# Patient Record
Sex: MALE | Race: WHITE | Employment: OTHER | URBAN - METROPOLITAN AREA
[De-identification: names, ages, dates, MRNs, and addresses within clinical notes are randomized per-mention and may not be internally consistent; named-entity substitution may affect disease eponyms.]

---

## 2018-11-27 ENCOUNTER — OFFICE VISIT (OUTPATIENT)
Dept: ORTHOPEDIC SURGERY | Age: 73
End: 2018-11-27
Payer: MEDICARE

## 2018-11-27 VITALS
SYSTOLIC BLOOD PRESSURE: 136 MMHG | BODY MASS INDEX: 27.59 KG/M2 | DIASTOLIC BLOOD PRESSURE: 84 MMHG | WEIGHT: 215 LBS | HEIGHT: 74 IN | HEART RATE: 85 BPM

## 2018-11-27 DIAGNOSIS — M75.101 TEAR OF RIGHT ROTATOR CUFF, UNSPECIFIED TEAR EXTENT: ICD-10-CM

## 2018-11-27 DIAGNOSIS — M19.011 OSTEOARTHRITIS OF GLENOHUMERAL JOINT, RIGHT: Primary | ICD-10-CM

## 2018-11-27 DIAGNOSIS — M25.511 RIGHT SHOULDER PAIN, UNSPECIFIED CHRONICITY: ICD-10-CM

## 2018-11-27 PROCEDURE — 1036F TOBACCO NON-USER: CPT | Performed by: PHYSICIAN ASSISTANT

## 2018-11-27 PROCEDURE — 1101F PT FALLS ASSESS-DOCD LE1/YR: CPT | Performed by: PHYSICIAN ASSISTANT

## 2018-11-27 PROCEDURE — 1123F ACP DISCUSS/DSCN MKR DOCD: CPT | Performed by: PHYSICIAN ASSISTANT

## 2018-11-27 PROCEDURE — 4040F PNEUMOC VAC/ADMIN/RCVD: CPT | Performed by: PHYSICIAN ASSISTANT

## 2018-11-27 PROCEDURE — 99213 OFFICE O/P EST LOW 20 MIN: CPT | Performed by: PHYSICIAN ASSISTANT

## 2018-11-27 PROCEDURE — G8484 FLU IMMUNIZE NO ADMIN: HCPCS | Performed by: PHYSICIAN ASSISTANT

## 2018-11-27 PROCEDURE — 3017F COLORECTAL CA SCREEN DOC REV: CPT | Performed by: PHYSICIAN ASSISTANT

## 2018-11-27 PROCEDURE — G8427 DOCREV CUR MEDS BY ELIG CLIN: HCPCS | Performed by: PHYSICIAN ASSISTANT

## 2018-11-27 PROCEDURE — G8419 CALC BMI OUT NRM PARAM NOF/U: HCPCS | Performed by: PHYSICIAN ASSISTANT

## 2018-11-27 RX ORDER — OMEPRAZOLE 40 MG/1
CAPSULE, DELAYED RELEASE ORAL
COMMUNITY
Start: 2018-11-24

## 2018-11-27 RX ORDER — MELOXICAM 15 MG/1
15 TABLET ORAL DAILY
Qty: 30 TABLET | Refills: 1 | Status: SHIPPED | OUTPATIENT
Start: 2018-11-27 | End: 2019-02-09 | Stop reason: SDUPTHER

## 2018-11-27 RX ORDER — LISINOPRIL AND HYDROCHLOROTHIAZIDE 12.5; 1 MG/1; MG/1
TABLET ORAL
COMMUNITY
Start: 2018-11-24 | End: 2019-09-26

## 2018-11-27 RX ORDER — ATORVASTATIN CALCIUM 40 MG/1
TABLET, FILM COATED ORAL
Refills: 2 | COMMUNITY
Start: 2018-10-23 | End: 2022-01-25

## 2018-11-27 RX ORDER — EZETIMIBE AND SIMVASTATIN 10; 20 MG/1; MG/1
TABLET ORAL
Refills: 0 | COMMUNITY
Start: 2018-08-26 | End: 2018-11-27

## 2018-11-27 RX ORDER — DOXAZOSIN 8 MG/1
TABLET ORAL
COMMUNITY
Start: 2018-11-24

## 2019-01-05 ENCOUNTER — OFFICE VISIT (OUTPATIENT)
Dept: ORTHOPEDIC SURGERY | Age: 74
End: 2019-01-05
Payer: MEDICARE

## 2019-01-05 VITALS
HEIGHT: 74 IN | WEIGHT: 214.95 LBS | DIASTOLIC BLOOD PRESSURE: 94 MMHG | HEART RATE: 70 BPM | SYSTOLIC BLOOD PRESSURE: 145 MMHG | BODY MASS INDEX: 27.59 KG/M2

## 2019-01-05 DIAGNOSIS — M19.011 OSTEOARTHRITIS OF GLENOHUMERAL JOINT, RIGHT: ICD-10-CM

## 2019-01-05 DIAGNOSIS — M75.101 TEAR OF RIGHT ROTATOR CUFF, UNSPECIFIED TEAR EXTENT: ICD-10-CM

## 2019-01-05 DIAGNOSIS — M25.512 LEFT SHOULDER PAIN, UNSPECIFIED CHRONICITY: ICD-10-CM

## 2019-01-05 DIAGNOSIS — M25.511 RIGHT SHOULDER PAIN, UNSPECIFIED CHRONICITY: Primary | ICD-10-CM

## 2019-01-05 PROCEDURE — G8484 FLU IMMUNIZE NO ADMIN: HCPCS | Performed by: ORTHOPAEDIC SURGERY

## 2019-01-05 PROCEDURE — 1123F ACP DISCUSS/DSCN MKR DOCD: CPT | Performed by: ORTHOPAEDIC SURGERY

## 2019-01-05 PROCEDURE — 1036F TOBACCO NON-USER: CPT | Performed by: ORTHOPAEDIC SURGERY

## 2019-01-05 PROCEDURE — 3017F COLORECTAL CA SCREEN DOC REV: CPT | Performed by: ORTHOPAEDIC SURGERY

## 2019-01-05 PROCEDURE — 1101F PT FALLS ASSESS-DOCD LE1/YR: CPT | Performed by: ORTHOPAEDIC SURGERY

## 2019-01-05 PROCEDURE — G8427 DOCREV CUR MEDS BY ELIG CLIN: HCPCS | Performed by: ORTHOPAEDIC SURGERY

## 2019-01-05 PROCEDURE — 4040F PNEUMOC VAC/ADMIN/RCVD: CPT | Performed by: ORTHOPAEDIC SURGERY

## 2019-01-05 PROCEDURE — G8419 CALC BMI OUT NRM PARAM NOF/U: HCPCS | Performed by: ORTHOPAEDIC SURGERY

## 2019-01-05 PROCEDURE — 99214 OFFICE O/P EST MOD 30 MIN: CPT | Performed by: ORTHOPAEDIC SURGERY

## 2019-02-13 RX ORDER — MELOXICAM 15 MG/1
TABLET ORAL
Qty: 30 TABLET | Refills: 0 | Status: SHIPPED | OUTPATIENT
Start: 2019-02-13 | End: 2019-09-26

## 2019-09-26 ENCOUNTER — OFFICE VISIT (OUTPATIENT)
Dept: ORTHOPEDIC SURGERY | Age: 74
End: 2019-09-26
Payer: MEDICARE

## 2019-09-26 VITALS
SYSTOLIC BLOOD PRESSURE: 130 MMHG | DIASTOLIC BLOOD PRESSURE: 77 MMHG | HEIGHT: 74 IN | HEART RATE: 84 BPM | BODY MASS INDEX: 27.59 KG/M2 | WEIGHT: 214.95 LBS

## 2019-09-26 DIAGNOSIS — M25.512 LEFT SHOULDER PAIN, UNSPECIFIED CHRONICITY: ICD-10-CM

## 2019-09-26 DIAGNOSIS — M19.011 OSTEOARTHRITIS OF GLENOHUMERAL JOINT, RIGHT: ICD-10-CM

## 2019-09-26 DIAGNOSIS — M25.511 RIGHT SHOULDER PAIN, UNSPECIFIED CHRONICITY: Primary | ICD-10-CM

## 2019-09-26 PROBLEM — N40.0 PROSTATE ENLARGEMENT: Status: ACTIVE | Noted: 2019-09-26

## 2019-09-26 PROBLEM — I51.9 LV DYSFUNCTION: Status: ACTIVE | Noted: 2019-07-10

## 2019-09-26 PROBLEM — I42.9 CARDIOMYOPATHY (HCC): Status: ACTIVE | Noted: 2019-07-10

## 2019-09-26 PROBLEM — E78.00 HYPERCHOLESTEROLEMIA: Status: ACTIVE | Noted: 2019-09-26

## 2019-09-26 PROBLEM — I10 ESSENTIAL HYPERTENSION: Status: ACTIVE | Noted: 2019-09-26

## 2019-09-26 PROBLEM — M10.071 ACUTE IDIOPATHIC GOUT OF RIGHT FOOT: Status: ACTIVE | Noted: 2017-04-04

## 2019-09-26 PROCEDURE — 4040F PNEUMOC VAC/ADMIN/RCVD: CPT | Performed by: ORTHOPAEDIC SURGERY

## 2019-09-26 PROCEDURE — 1036F TOBACCO NON-USER: CPT | Performed by: ORTHOPAEDIC SURGERY

## 2019-09-26 PROCEDURE — 3017F COLORECTAL CA SCREEN DOC REV: CPT | Performed by: ORTHOPAEDIC SURGERY

## 2019-09-26 PROCEDURE — G8427 DOCREV CUR MEDS BY ELIG CLIN: HCPCS | Performed by: ORTHOPAEDIC SURGERY

## 2019-09-26 PROCEDURE — G8419 CALC BMI OUT NRM PARAM NOF/U: HCPCS | Performed by: ORTHOPAEDIC SURGERY

## 2019-09-26 PROCEDURE — 99214 OFFICE O/P EST MOD 30 MIN: CPT | Performed by: ORTHOPAEDIC SURGERY

## 2019-09-26 PROCEDURE — 1123F ACP DISCUSS/DSCN MKR DOCD: CPT | Performed by: ORTHOPAEDIC SURGERY

## 2019-09-26 RX ORDER — METOPROLOL SUCCINATE 25 MG/1
TABLET, EXTENDED RELEASE ORAL
Refills: 3 | COMMUNITY
Start: 2019-09-20 | End: 2022-01-25

## 2019-09-26 RX ORDER — FEXOFENADINE HCL AND PSEUDOEPHEDRINE HCI 180; 240 MG/1; MG/1
1 TABLET, EXTENDED RELEASE ORAL
COMMUNITY

## 2019-09-26 RX ORDER — LISINOPRIL AND HYDROCHLOROTHIAZIDE 25; 20 MG/1; MG/1
TABLET ORAL
Refills: 3 | COMMUNITY
Start: 2019-09-20

## 2019-09-26 RX ORDER — ASPIRIN 81 MG/1
TABLET, CHEWABLE ORAL
Refills: 11 | COMMUNITY
Start: 2019-09-20

## 2019-09-26 NOTE — PROGRESS NOTES
12 [facility]  History and Physical  Shoulder Pain    Date:  2019    Name:  Helen Tran  Address:  [address] [place] [address]    :  1945      Age:   69 y.o.    SSN:  [id number]      Medical Record Number:  [id number]    Reason for Visit:    Shoulder Pain (F/u right shoulder)      HPI:   Helen Tran is a 69 y.o. male who presents to our office today for follow up of the right shoulder pain. Patient reports that he has been diagnosed with shoulder arthritis by his rheumatologist.  He has underwent conservative management for some time including oral anti-inflammatory agents as well as corticosteroid injections. The patient reports that his shoulders are affecting his overall quality of life and daily function. Injuries. He would like to explore surgical options of possible. Pain Assessment  Location of Pain: Shoulder  Location Modifiers: Right  Severity of Pain: 5  Quality of Pain: Aching  Duration of Pain: Persistent  Frequency of Pain: Constant  Aggravating Factors: (sleeping, gen use)  Limiting Behavior: Yes  Result of Injury: No  Work-Related Injury: No  Are there other pain locations you wish to document?: No    Cardiovascular ROS: positive for - HX MI      Review of Systems:  A 14 point review of systems available in the scanned medical record as documented by the patient on 18. The review is negative with the exception of those things mentioned in the History of Present Illness and Past Medical History. Past Medical History:  Patient's medications, allergies, past medical, surgical, social and family histories were reviewed and updated as appropriate. Allergies:   Allergies   Allergen Reactions    Amoxicillin-Pot Clavulanate Diarrhea    Ezetimibe-Simvastatin Other (See Comments)     Did not feel well on med       Physical Exam:  Vitals:    19 1327   BP: 130/77   Pulse: 84     General: Helen Tran is a healthy and All questions were answered and written informed consent for surgery was obtained today.          9/26/2019  1:47 PM      David Arriaga PA-C  Orthopaedic Sports Medicine Physician Assistant    During this examination, Екатерина Canas PA-C, functioned as a scribe for Dr. Erenest Gaucher. This dictation was performed with a verbal recognition program (DRAGON) and it was checked for errors. It is possible that there are still dictated errors within this office note. If so, please bring any errors to my attention for an addendum. All efforts were made to ensure that this office note is accurate.  _________________  I, Dr. Erenest Gaucher, personally performed the services described in this documentation as described by David Arriaga PA-C in my presence, and it is both accurate and complete. Alecia Snider MD, PhD  9/26/2019

## 2019-10-02 ENCOUNTER — TELEPHONE (OUTPATIENT)
Dept: ORTHOPEDIC SURGERY | Age: 74
End: 2019-10-02

## 2019-10-24 ENCOUNTER — OFFICE VISIT (OUTPATIENT)
Dept: ORTHOPEDIC SURGERY | Age: 74
End: 2019-10-24
Payer: MEDICARE

## 2019-10-24 VITALS
DIASTOLIC BLOOD PRESSURE: 73 MMHG | BODY MASS INDEX: 27.59 KG/M2 | HEART RATE: 63 BPM | HEIGHT: 74 IN | WEIGHT: 214.95 LBS | SYSTOLIC BLOOD PRESSURE: 124 MMHG

## 2019-10-24 DIAGNOSIS — M19.011 OSTEOARTHRITIS OF GLENOHUMERAL JOINT, RIGHT: ICD-10-CM

## 2019-10-24 DIAGNOSIS — M25.511 RIGHT SHOULDER PAIN, UNSPECIFIED CHRONICITY: Primary | ICD-10-CM

## 2019-10-24 PROCEDURE — G8484 FLU IMMUNIZE NO ADMIN: HCPCS | Performed by: ORTHOPAEDIC SURGERY

## 2019-10-24 PROCEDURE — G8417 CALC BMI ABV UP PARAM F/U: HCPCS | Performed by: ORTHOPAEDIC SURGERY

## 2019-10-24 PROCEDURE — 1036F TOBACCO NON-USER: CPT | Performed by: ORTHOPAEDIC SURGERY

## 2019-10-24 PROCEDURE — 3017F COLORECTAL CA SCREEN DOC REV: CPT | Performed by: ORTHOPAEDIC SURGERY

## 2019-10-24 PROCEDURE — G8427 DOCREV CUR MEDS BY ELIG CLIN: HCPCS | Performed by: ORTHOPAEDIC SURGERY

## 2019-10-24 PROCEDURE — L3670 SO ACRO/CLAV CAN WEB PRE OTS: HCPCS | Performed by: ORTHOPAEDIC SURGERY

## 2019-10-24 PROCEDURE — 99213 OFFICE O/P EST LOW 20 MIN: CPT | Performed by: ORTHOPAEDIC SURGERY

## 2019-10-24 PROCEDURE — 4040F PNEUMOC VAC/ADMIN/RCVD: CPT | Performed by: ORTHOPAEDIC SURGERY

## 2019-10-24 PROCEDURE — 1123F ACP DISCUSS/DSCN MKR DOCD: CPT | Performed by: ORTHOPAEDIC SURGERY

## 2019-11-05 ENCOUNTER — TELEPHONE (OUTPATIENT)
Dept: ORTHOPEDIC SURGERY | Age: 74
End: 2019-11-05

## 2019-11-08 ENCOUNTER — TELEPHONE (OUTPATIENT)
Dept: ORTHOPEDIC SURGERY | Age: 74
End: 2019-11-08

## 2019-11-12 ENCOUNTER — OFFICE VISIT (OUTPATIENT)
Dept: ORTHOPEDIC SURGERY | Age: 74
End: 2019-11-12

## 2019-11-12 VITALS
HEART RATE: 89 BPM | HEIGHT: 74 IN | BODY MASS INDEX: 27.59 KG/M2 | SYSTOLIC BLOOD PRESSURE: 123 MMHG | DIASTOLIC BLOOD PRESSURE: 67 MMHG | WEIGHT: 214.95 LBS

## 2019-11-12 DIAGNOSIS — Z98.890 S/P SHOULDER SURGERY: ICD-10-CM

## 2019-11-12 DIAGNOSIS — Z96.611 H/O TOTAL SHOULDER REPLACEMENT, RIGHT: ICD-10-CM

## 2019-11-12 DIAGNOSIS — M19.011 OSTEOARTHRITIS OF GLENOHUMERAL JOINT, RIGHT: ICD-10-CM

## 2019-11-12 DIAGNOSIS — M25.511 RIGHT SHOULDER PAIN, UNSPECIFIED CHRONICITY: Primary | ICD-10-CM

## 2019-11-12 PROCEDURE — 99024 POSTOP FOLLOW-UP VISIT: CPT | Performed by: ORTHOPAEDIC SURGERY

## 2019-11-12 RX ORDER — OXYCODONE HYDROCHLORIDE AND ACETAMINOPHEN 5; 325 MG/1; MG/1
TABLET ORAL
COMMUNITY
Start: 2019-11-05 | End: 2019-11-12

## 2019-11-12 RX ORDER — TRAZODONE HYDROCHLORIDE 50 MG/1
TABLET ORAL
COMMUNITY
Start: 2019-11-07

## 2019-11-12 RX ORDER — LEVOFLOXACIN 500 MG/1
TABLET, FILM COATED ORAL
COMMUNITY
Start: 2019-11-07 | End: 2022-01-25

## 2019-11-15 ENCOUNTER — TELEPHONE (OUTPATIENT)
Dept: ORTHOPEDIC SURGERY | Age: 74
End: 2019-11-15

## 2019-11-26 ENCOUNTER — OFFICE VISIT (OUTPATIENT)
Dept: ORTHOPEDIC SURGERY | Age: 74
End: 2019-11-26

## 2019-11-26 VITALS
HEART RATE: 89 BPM | WEIGHT: 214.95 LBS | SYSTOLIC BLOOD PRESSURE: 123 MMHG | DIASTOLIC BLOOD PRESSURE: 67 MMHG | HEIGHT: 74 IN | BODY MASS INDEX: 27.59 KG/M2

## 2019-11-26 DIAGNOSIS — Z96.611 STATUS POST TOTAL REPLACEMENT OF RIGHT SHOULDER: Primary | ICD-10-CM

## 2019-11-26 DIAGNOSIS — Z98.890 S/P SHOULDER SURGERY: ICD-10-CM

## 2019-11-26 PROCEDURE — 99024 POSTOP FOLLOW-UP VISIT: CPT | Performed by: ORTHOPAEDIC SURGERY

## 2019-12-17 ENCOUNTER — OFFICE VISIT (OUTPATIENT)
Dept: ORTHOPEDIC SURGERY | Age: 74
End: 2019-12-17

## 2019-12-17 VITALS
BODY MASS INDEX: 27.59 KG/M2 | HEIGHT: 74 IN | SYSTOLIC BLOOD PRESSURE: 122 MMHG | DIASTOLIC BLOOD PRESSURE: 71 MMHG | HEART RATE: 81 BPM | WEIGHT: 214.95 LBS

## 2019-12-17 DIAGNOSIS — Z98.890 S/P SHOULDER SURGERY: Primary | ICD-10-CM

## 2019-12-17 DIAGNOSIS — Z96.611 STATUS POST TOTAL REPLACEMENT OF RIGHT SHOULDER: ICD-10-CM

## 2019-12-17 PROCEDURE — 99024 POSTOP FOLLOW-UP VISIT: CPT | Performed by: PHYSICIAN ASSISTANT

## 2020-01-09 ENCOUNTER — OFFICE VISIT (OUTPATIENT)
Dept: ORTHOPEDIC SURGERY | Age: 75
End: 2020-01-09

## 2020-01-09 VITALS
HEIGHT: 74 IN | WEIGHT: 214.95 LBS | DIASTOLIC BLOOD PRESSURE: 77 MMHG | SYSTOLIC BLOOD PRESSURE: 132 MMHG | HEART RATE: 64 BPM | BODY MASS INDEX: 27.59 KG/M2

## 2020-01-09 PROCEDURE — 99024 POSTOP FOLLOW-UP VISIT: CPT | Performed by: ORTHOPAEDIC SURGERY

## 2020-02-04 ENCOUNTER — OFFICE VISIT (OUTPATIENT)
Dept: ORTHOPEDIC SURGERY | Age: 75
End: 2020-02-04

## 2020-02-04 VITALS
HEIGHT: 74 IN | HEART RATE: 75 BPM | DIASTOLIC BLOOD PRESSURE: 82 MMHG | SYSTOLIC BLOOD PRESSURE: 134 MMHG | WEIGHT: 214.95 LBS | BODY MASS INDEX: 27.59 KG/M2

## 2020-02-04 PROCEDURE — 99024 POSTOP FOLLOW-UP VISIT: CPT | Performed by: ORTHOPAEDIC SURGERY

## 2020-02-04 NOTE — PROGRESS NOTES
Chief Complaint    Shoulder Pain (F/U RIGHT SHOULDER)      History of Present Illness:  Skye Holland is a pleasant, 69 y.o., male, here today for follow up of his right shoulder. He is now 3 months out following an anatomic total shoulder replacement. He is doing his physical therapy at [facility] and progressing well. He is very happy with his recovery up to this point. [place] and has begun to ease back into some of his daily chores, avoiding vigorous activities. He reports no new injuries or setbacks. Pain Assessment  Location of Pain: Shoulder  Location Modifiers: Right  Severity of Pain: 0  Limiting Behavior: No  Relieving Factors: Ice, Exercise  Result of Injury: No  Work-Related Injury: No  Are there other pain locations you wish to document?: No      Medical History:  Patient's medications, allergies, past medical, surgical, social and family histories were reviewed and updated as appropriate. Patient has had no medical changes since last evaluated      Review of Systems  A 14 point review of systems was completed by the patient on 12/17/19 and is available in the media section of the scanned medical record and was reviewed on 2/4/2020. The review is negative with the exception of those things mentioned in the HPI and Past Medical History    Vital Signs:  Vitals:    02/04/20 1001   BP: 134/82   Pulse: 75       General/Appearance: Alert and oriented and in no apparent distress. Skin:  There are no skin lesions, cellulitis, or extreme edema. The patient has warm and well-perfused Bilateral upper extremities with brisk capillary refill. Right Shoulder Exam:  Inspection: Incision healing well. No gross deformities, no signs of infection. Palpation: Nontender to palpate    Active Range of Motion:   Forward Elevation 145, Abduction 130, External Rotation 45, Internal Rotation L2    Passive Range of Motion: Deferred    Strength:  External Rotation 4-/5, Internal Rotation documentation as described by Laurita Hoyos ATC in my presence, and it is both accurate and complete. Alecia Amador MD, PhD  2/4/2020

## 2020-02-04 NOTE — LETTER
Shoulder Elbow Rehabilitation Referral    Patient Name: Sarai James      YOB: 1945    Diagnosis:   1. S/P shoulder surgery    2. Status post total replacement of right shoulder    3. Right shoulder pain, unspecified chronicity        Precautions:     Post Op Instructions:  [] Continuous passive motion (CPM)  [x] Elbow range of motion  [x] Exercise in plane of scapula   []  Strengthening     [] Pulley and instruction    [x] Home exercise program (copy to patient)   [] Sling when arm at risk  [] Sling or brace at all times   [x] AAROM: Forward elevation to Full            [x] AAROM: External rotation to Full    [] Isometric external rotator strengthening [x] AAROM: internal rotation: up the back  [x] Isometric abductor strengthening  [x] AAROM: Internal abduction     [] Isometric internal rotator strengthening [x] AAROM: cross-body adduction             Stretching:     Strengthening:  [x] Four quadrant (FE, ER, IR, CBA)  [x] Rotator cuff (ER, IR, Abd)  [x] Forward Elevation    [x] External Rotators     [x] External Rotation    [x] Internal Rotators  [x] Internal Rotation: up/back   [x] Abductors     [x] Internal Rotation: supine in abduction  [x] Flexors  [x] Cross-body abduction    [x] Extensors  [x] Pendulum (FE, Abd/Add, cw/ccw)  [x] Scapular Stabilizers   [x] Wall-walking (FE, Abd)    [x] Shoulder shrugs     [x] Table slides      [x] Rhomboid pinch  [] Elbow (flex, ext, pron, sup)    [] Lat.  Pull downs     [] Medial epicondylitis program    [] Forward punch   [] Lateral epicondylitis program    [] Internal rotators     [x] Progressive resistive exercises  [] Bench Press        [] Bench press plus  Activities:     [] Lateral pull-downs  [x] Rowing     [x] Progressive two-hand supine press  [] Stepper/Exercise bike   [x] Biceps: curls/supination  [] Swimming  [] Water exercises    Modalities: PRN    Return to Sport:  [] Ultrasound     [] Plyometrics [] Iontophoresis     [] Rhythmic stabilization  [] Moist heat     [] Core strengthening   [] Massage     [] Sports specific program:   [x] Cryotherapy      [] Electrical stimulation     [] Paraffin  [] Whirlpool  [] TENS    [x] Home exercise program (copy to patient). Perform exercises for:   15     minutes    2-3      times/day  [x] Supervised physical therapy  Frequency: []  1x week  [x] 2x week  [] 3x week  [] Other:   Duration: [] 2 weeks   [] 4 weeks  [x] 6 weeks  [] Other:     Additional Instructions:           Alecia aBrron MD, PhD

## 2020-03-19 ENCOUNTER — OFFICE VISIT (OUTPATIENT)
Dept: ORTHOPEDIC SURGERY | Age: 75
End: 2020-03-19
Payer: MEDICARE

## 2020-03-19 VITALS — WEIGHT: 214.95 LBS | BODY MASS INDEX: 27.59 KG/M2 | HEIGHT: 74 IN

## 2020-03-19 PROCEDURE — G8417 CALC BMI ABV UP PARAM F/U: HCPCS | Performed by: ORTHOPAEDIC SURGERY

## 2020-03-19 PROCEDURE — 1036F TOBACCO NON-USER: CPT | Performed by: ORTHOPAEDIC SURGERY

## 2020-03-19 PROCEDURE — G8427 DOCREV CUR MEDS BY ELIG CLIN: HCPCS | Performed by: ORTHOPAEDIC SURGERY

## 2020-03-19 PROCEDURE — G8484 FLU IMMUNIZE NO ADMIN: HCPCS | Performed by: ORTHOPAEDIC SURGERY

## 2020-03-19 PROCEDURE — 3017F COLORECTAL CA SCREEN DOC REV: CPT | Performed by: ORTHOPAEDIC SURGERY

## 2020-03-19 PROCEDURE — 1123F ACP DISCUSS/DSCN MKR DOCD: CPT | Performed by: ORTHOPAEDIC SURGERY

## 2020-03-19 PROCEDURE — 4040F PNEUMOC VAC/ADMIN/RCVD: CPT | Performed by: ORTHOPAEDIC SURGERY

## 2020-03-19 PROCEDURE — 99213 OFFICE O/P EST LOW 20 MIN: CPT | Performed by: ORTHOPAEDIC SURGERY

## 2020-03-19 NOTE — PROGRESS NOTES
Patient has had no medical changes since last evaluated
also have him fill out a self-assessment questionnaire today. We will also want him to follow back up at his one-year postop visit for repeat radiographs. All his questions were fully answered today. 3/19/2020  10:47 AM      Refugio Winchester PA-C  Orthopaedic Sports Medicine Physician Assistant    During this examination, I, Refugio Winchester PA-C, functioned as a scribe for Dr. Charlie Grant. This dictation was performed with a verbal recognition program (DRAGON) and it was checked for errors. It is possible that there are still dictated errors within this office note. If so, please bring any errors to my attention for an addendum. All efforts were made to ensure that this office note is accurate.  _________________  I, Dr. Charlie Grant, personally performed the services described in this documentation as described by Refugio Winchester PA-C in my presence, and it is both accurate and complete. Alecia Mendez MD, PhD  3/19/2020

## 2022-01-25 ENCOUNTER — OFFICE VISIT (OUTPATIENT)
Dept: ORTHOPEDIC SURGERY | Age: 77
End: 2022-01-25

## 2022-01-25 VITALS — HEIGHT: 73 IN | BODY MASS INDEX: 28.1 KG/M2 | WEIGHT: 212 LBS

## 2022-01-25 DIAGNOSIS — M25.511 RIGHT SHOULDER PAIN, UNSPECIFIED CHRONICITY: Primary | ICD-10-CM

## 2022-01-25 DIAGNOSIS — Z96.611 HISTORY OF TOTAL REPLACEMENT OF RIGHT SHOULDER JOINT: ICD-10-CM

## 2022-01-25 PROCEDURE — 99999 PR OFFICE/OUTPT VISIT,PROCEDURE ONLY: CPT | Performed by: ORTHOPAEDIC SURGERY

## 2022-01-25 RX ORDER — TRAMADOL HYDROCHLORIDE 50 MG/1
TABLET ORAL
COMMUNITY
Start: 2022-01-05

## 2022-01-25 RX ORDER — ATORVASTATIN CALCIUM 80 MG/1
TABLET, FILM COATED ORAL
COMMUNITY
Start: 2021-12-04

## 2022-01-25 NOTE — PROGRESS NOTES
12 [facility]  History and Physical  Shoulder Pain    Date:  2022    Name:  Dimple Richmond  Address:  [address] [place] [address]    :  1945      Age:   77 y.o.    SSN:  [id number]      Medical Record Number:  <[id number]>    Reason for Visit:    Shoulder Pain (F/U RIGHT SHOULDER ALTIVATE STUDY)      HPI:   Dimple Richmond is a 77 y.o. male who presents to our office today for follow up of the right shoulder pain. The patient underwent an anatomic total shoulder replacement on 2019. The patient is here today for his 2 years follow-up. The patient reports he is doing quite well and is happy how far he is comes in surgery. He is able to continue with his day-to-day activities at home. Denies any injury since his last visit. He is having left shoulder pain and limited motion. Pain Assessment  Location of Pain: Shoulder  Location Modifiers: Right  Severity of Pain: 0  Limiting Behavior: No  Result of Injury: No  Work-Related Injury: No  Are there other pain locations you wish to document?: No    No notes on file    Review of Systems:  A 14 point review of systems available in the scanned medical record as documented by the patient on 2019. The review is negative with the exception of those things mentioned in the History of Present Illness and Past Medical History. Past Medical History:  Patient's medications, allergies, past medical, surgical, social and family histories were reviewed and updated as appropriate. Allergies: Allergies   Allergen Reactions    Amoxicillin-Pot Clavulanate Diarrhea    Ezetimibe-Simvastatin Other (See Comments)     Did not feel well on med       Physical Exam:  There were no vitals filed for this visit. General: Dimple Richmond is a healthy and well appearing 77 y.o. male who is sitting comfortably in our office in acute distress. Skin:  There are no skin lesions, cellulitis, or extreme edema.  The patient has warm and well-perfused Bilateral upper extremities with brisk capillary refill. Eyes: Extra-ocular muscles intact  Mouth: Oral mucosa moist. No perioral lesions  Pulm: Respirations unlabored and regular. Neuro: Alert and oriented x3    right Shoulder Exam:  Inspection: Incisions fully healed. No gross deformities, no signs of infection. Palpation: No crepitus about the right shoulder with passive motion. He has no tenderness about the anterior shoulder. Active Range of Motion: Forward elevation of 140, abduction of 150, external rotation with elbow at the side 50 versus 30 on the left, internal rotation to the back is L2 versus L5 on the left    Passive Range of Motion: Passively forward elevation can be further increased to 160. Strength: External rotation with resistance with elbow at the side 4/5, internal rotation with resistance with elbow at the side +4/5    Special Tests:   No Cain muscle deformity. No Aurora sign, negative belly press    Neurovascular: Sensation to light touch is intact, no motor deficits, palpable radial pulses 2+    Additional Examinations:    Examination of the contralateral extremity does not show any tenderness, deformity or injury. Range of motion is unremarkable. There is no gross instability. There are no rashes, ulcerations or lesions. Strength and tone are normal.      Radiographic:  Radiographs of the right shoulder were obtained during today's visit including AP and axillary views. Impression: Anatomical shoulder replacement with components in a stable position. Possible indirect signs of rotator cuff thinning. Assessment:  Brigette Bass is a 77 y.o. male who underwent a right anatomic shoulder arthroplasty on 11/4/2019. Impression:  Encounter Diagnoses   Name Primary?     Right shoulder pain, unspecified chronicity Yes    History of total replacement of right shoulder joint        Office Procedures:  Orders Placed This Encounter Procedures    XR SHOULDER RIGHT (MIN 2 VIEWS)     Standing Status:   Future     Number of Occurrences:   1     Standing Expiration Date:   1/24/2023       Plan:   Patient has done tremendously well in his overall recovery. He is very satisfied with this right shoulder. We discussed his left shoulder arthritis during today's visit. We will also want him to follow back up at his 5-year postop visit for repeat radiographs and . All his questions were fully answered today. Antwan Bush MD  [facility] Clinical fellow  1/25/2022  9:28 AM     During this examination, I, Antwan Bush MD, functioned as a scribe for Dr. Presley Grullon. The history taking and physical examination were performed by Dr. Jordyn Swenson. All counseling during the appointment was performed between the patient and Dr. Jordyn Swenson. This dictation was performed with a verbal recognition program (DRAGON) and it was checked for errors. It is possible that there are still dictated errors within this office note. If so, please bring any areas to my attention for an addendum. All efforts were made to ensure that this office note is accurate.  ____________________  I was physically present and personally supervised the Orthopaedic Sports Medicine Fellow in the evaluation and development of a treatment plan for this patient. I personally interviewed the patient and performed a physical examination. In addition, I discussed the patient's condition and treatment options with them. I have also reviewed and agree with the past medical, family and social history unless otherwise noted. All of the patient's questions were answered. Alecia Swenson MD, PhD  1/25/2022

## 2024-06-27 ENCOUNTER — OFFICE VISIT (OUTPATIENT)
Dept: ORTHOPEDIC SURGERY | Age: 79
End: 2024-06-27
Payer: MEDICARE

## 2024-06-27 DIAGNOSIS — R52 PAIN: Primary | ICD-10-CM

## 2024-06-27 DIAGNOSIS — Z96.611 H/O TOTAL SHOULDER REPLACEMENT, RIGHT: ICD-10-CM

## 2024-06-27 DIAGNOSIS — M19.012 OSTEOARTHRITIS OF LEFT GLENOHUMERAL JOINT: ICD-10-CM

## 2024-06-27 PROCEDURE — 1123F ACP DISCUSS/DSCN MKR DOCD: CPT | Performed by: ORTHOPAEDIC SURGERY

## 2024-06-27 PROCEDURE — 99215 OFFICE O/P EST HI 40 MIN: CPT | Performed by: ORTHOPAEDIC SURGERY

## 2024-06-28 NOTE — PROGRESS NOTES
no motor deficits, palpable radial pulses 2+      Radiographic:  3 xray views of the left  shoulder including True AP in internal and external and axillary lateral were taken in our office today reveal no fractures, dislocations, visible tumors, or signs of acute trauma.      Radiographic Impression: Severe glenohumeral osteoarthritis with significant retroversion noted on axillary view.  No fracture/dislocation.  No bony lesions or cortical erosion      Assessment:  Lane Borjas is a 79 y.o. male with left shoulder pain secondary to severe glenohumeral osteoarthritis.  Hx of R TSA in 2019 and that side is doing very well.    Impression:  Encounter Diagnoses   Name Primary?    Pain Yes    Osteoarthritis of left glenohumeral joint     H/O total shoulder replacement, right        Office Procedures:  Orders Placed This Encounter   Procedures    XR SHOULDER LEFT (MIN 2 VIEWS)     Standing Status:   Future     Number of Occurrences:   1     Standing Expiration Date:   6/20/2025     Order Specific Question:   Reason for exam:     Answer:   left shoulder pain    Ambulatory referral to Physical Therapy     Referral Priority:   Routine     Referral Type:   Eval and Treat     Referral Reason:   Specialty Services Required     Requested Specialty:   Physical Therapist     Number of Visits Requested:   1       Plan:   Lane Borjas presents today for evaluation of chronic left shoulder pain.  He has had this pain for several years and has failed conservative treatment including OTC anti-inflammatories, PT and injection.  He has a history of R TSA by Dr. Fernandes in 2019 and is doing well from this and is interested in getting the same result on his left.  His radiographs show significant retroversion and therefore we would like to obtain a CT scan for surgical planning.  We would also like to obtain an MRI to evaluate his rotator cuff.  After we get his imaging, we would like to see him again to go over this.  Regarding his

## 2024-07-08 ENCOUNTER — EVALUATION (OUTPATIENT)
Dept: PHYSICAL THERAPY | Age: 79
End: 2024-07-08
Payer: MEDICARE

## 2024-07-08 DIAGNOSIS — M25.512 CHRONIC LEFT SHOULDER PAIN: ICD-10-CM

## 2024-07-08 DIAGNOSIS — G89.29 CHRONIC LEFT SHOULDER PAIN: ICD-10-CM

## 2024-07-08 DIAGNOSIS — M19.012 OSTEOARTHRITIS OF LEFT SHOULDER, UNSPECIFIED OSTEOARTHRITIS TYPE: Primary | ICD-10-CM

## 2024-07-08 DIAGNOSIS — R29.898 WEAKNESS OF LEFT SHOULDER: ICD-10-CM

## 2024-07-08 PROCEDURE — 97161 PT EVAL LOW COMPLEX 20 MIN: CPT | Performed by: PHYSICAL THERAPIST

## 2024-07-08 PROCEDURE — 97530 THERAPEUTIC ACTIVITIES: CPT | Performed by: PHYSICAL THERAPIST

## 2024-07-08 PROCEDURE — 97110 THERAPEUTIC EXERCISES: CPT | Performed by: PHYSICAL THERAPIST

## 2024-07-08 NOTE — PROGRESS NOTES
Minutes 50'        Charge Justification:  (08104) THERAPEUTIC EXERCISE - Provided verbal/tactile cueing for activities related to strengthening, flexibility, endurance, ROM performed to prevent loss of range of motion, maintain or improve muscular strength or increase flexibility, following either an injury or surgery.   (66022) HOME EXERCISE PROGRAM - Reviewed/Progressed HEP activities related to strengthening, flexibility, endurance, ROM performed to prevent loss of range of motion, maintain or improve muscular strength or increase flexibility, following either an injury or surgery.  (53993) THERAPEUTIC ACTIVITY - use of dynamic activities to improve functional performance. (Ex include squatting, ascending/descending stairs, walking, bending, lifting, catching, throwing, pushing, pulling, jumping.)  Direct, one on one contact, billed in 15-minute increments.    GOALS     Patient stated goal: to regain function of shoulder  [] Progressing: [] Met: [] Not Met: [] Adjusted    Therapist goals for Patient:   Short Term Goals: To be achieved in: 2 weeks  1. Independent in HEP and progression per patient tolerance, in order to prepare for surg.   [] Progressing: [] Met: [] Not Met: [] Adjusted      Overall Progression Towards Functional goals/ Treatment Progress Update:  [] Patient is progressing as expected towards functional goals listed.    [] Progression is slowed due to complexities/Impairments listed.  [] Progression has been slowed due to co-morbidities.  [x] Plan just implemented, too soon (<30days) to assess goals progression   [] Goals require adjustment due to lack of progress  [] Patient is not progressing as expected and requires additional follow up with physician  [] Other:     TREATMENT PLAN     Frequency/Duration: 1x/week for 1 weeks for the following treatment interventions:    Interventions:  Therapeutic Exercise (13239) including: strength training, ROM, and functional mobility  Therapeutic Activities

## 2024-09-05 ENCOUNTER — OFFICE VISIT (OUTPATIENT)
Dept: ORTHOPEDIC SURGERY | Age: 79
End: 2024-09-05
Payer: MEDICARE

## 2024-09-05 VITALS — HEIGHT: 73 IN | WEIGHT: 212 LBS | BODY MASS INDEX: 28.1 KG/M2

## 2024-09-05 DIAGNOSIS — M19.012 OSTEOARTHRITIS OF LEFT GLENOHUMERAL JOINT: Primary | ICD-10-CM

## 2024-09-05 PROCEDURE — 99214 OFFICE O/P EST MOD 30 MIN: CPT | Performed by: ORTHOPAEDIC SURGERY

## 2024-09-05 PROCEDURE — 1123F ACP DISCUSS/DSCN MKR DOCD: CPT | Performed by: ORTHOPAEDIC SURGERY

## 2024-09-05 RX ORDER — ALLOPURINOL 300 MG/1
300 TABLET ORAL DAILY
COMMUNITY
Start: 2024-08-29

## 2024-10-01 ENCOUNTER — PREP FOR PROCEDURE (OUTPATIENT)
Dept: ORTHOPEDIC SURGERY | Age: 79
End: 2024-10-01

## 2024-10-01 DIAGNOSIS — M19.012 ARTHRITIS OF LEFT SHOULDER REGION: ICD-10-CM

## 2024-10-01 DIAGNOSIS — M19.012 OSTEOARTHRITIS OF LEFT GLENOHUMERAL JOINT: Primary | ICD-10-CM

## 2024-10-29 ENCOUNTER — TELEPHONE (OUTPATIENT)
Dept: ORTHOPEDIC SURGERY | Age: 79
End: 2024-10-29

## 2024-10-29 NOTE — TELEPHONE ENCOUNTER
Orthopedic Nurse Navigator Summary- Surgery cancelled     Patient Name:  Lane Borjas  Anticipated Date of Surgery:  11/11/24  Attended Pre-op Education Class:  Video sent to patient email 10/23/24  PCP: Emilia Martinez MD  Date of PCP visit for H&P: 10/29/24  Is patient in a Pain Management program:  Review of Medical history reveals history of: HTN, H/O MI, PVC, Aortic root dilatation, LV dysfunction, CAD, Hyperlipidemia, Seronegative arthropathy multiple sites, Gout, Constipation, Osteoporosis, Prostate enlargement- previous TURP, GERD, Spinal stenosis- cervical and lumbar, Chronic low back pain, Diverticulitis, H/O anemia    Critical Lab Values  - Hemoglobin (g/dL):  Date: Value   - Hematocrit(%): Date:   Value   - HgbA1C:  Date:  Value  - Albumin:  Date:   Value   - BUN:  Date:   Value   - Creatinine:  Date:   Value     Coronary Artery Disease/HTN/CHF history: Yes  Does the patient see a Cardiologist: Juan Juarez MD  Date of most recent cardiac appt: 09/24/24  On any anticoagulation:  Aspirin 81 mg QD    Patient had appointment with Ammy Gardner MD on 10/28/24 after wearing Holter monitor for PVC's    Diabetes History:  No  Most recent HgbA1C:  Pulmonary:  COPD/Emphysema/Use of home oxygen: No  Alcohol use: No    BMI greater than 40 at time of scheduling:  No- BMI 27.97  Additional medical concerns:  Additional recommendations for above concerns:  Attended Pre-Hab program:    Anticipated Discharge Disposition:  Home with OPT  Who will be with patient at home following discharge:  wife- she is retired   Equipment patient already has:  recliner- patient needs sling  Bedroom on first or second floor:  first  Bathroom on first or second floor:  first  Weight bearing status:  lucio LEVI  Pre-op ambulatory status: no issues  Number of entry steps:  1  Caregiver assistance:  full time    Christi Saez RN  Date:  10/29/24

## 2024-10-29 NOTE — TELEPHONE ENCOUNTER
General Question     Subject: LEFT SHOULDER   Patient and /or Facility Request: Lane Borjas   Contact Number: [phone]     PATIENT'S WIFE REQUESTING A CALL BACK. WIFE STATES THAT THE PATIENT SEEN HIS PCP YESTERDAY AND SHE WOULD NOT SIGN OFF ON HIS SURGERY DUE TO HIM HAVING PNEUMONIA. PATIENT CARDIOLOGIST ALSO STATES THAT THE PATIENT NEEDS A HEART ABRASION. PATIENT'S WIFE STATES THAT THEY WILL HAVE TO HOLD OFF ON SURGERY AND WANTS TO KNOW IF THE PATIENT CAN HAVE AN INJECTION IN THE MEAN TIME      PLEASE CALL THE PATIENT BACK AT THE ABOVE NUMBER

## 2024-11-07 ENCOUNTER — OFFICE VISIT (OUTPATIENT)
Dept: ORTHOPEDIC SURGERY | Age: 79
End: 2024-11-07

## 2024-11-07 VITALS — WEIGHT: 212 LBS | HEIGHT: 73 IN | BODY MASS INDEX: 28.1 KG/M2

## 2024-11-07 DIAGNOSIS — M19.012 OSTEOARTHRITIS OF LEFT GLENOHUMERAL JOINT: Primary | ICD-10-CM

## 2024-11-07 RX ORDER — METOPROLOL SUCCINATE 25 MG/1
25 TABLET, EXTENDED RELEASE ORAL DAILY
COMMUNITY
Start: 2024-09-20

## 2024-11-07 RX ORDER — METHYLPREDNISOLONE ACETATE 40 MG/ML
80 INJECTION, SUSPENSION INTRA-ARTICULAR; INTRALESIONAL; INTRAMUSCULAR; SOFT TISSUE ONCE
Status: COMPLETED | OUTPATIENT
Start: 2024-11-07 | End: 2024-11-07

## 2024-11-07 RX ORDER — LIDOCAINE HYDROCHLORIDE 10 MG/ML
8 INJECTION, SOLUTION INFILTRATION; PERINEURAL ONCE
Status: COMPLETED | OUTPATIENT
Start: 2024-11-07 | End: 2024-11-07

## 2024-11-07 RX ORDER — FOLIC ACID 1 MG/1
1000 TABLET ORAL DAILY
COMMUNITY

## 2024-11-07 RX ADMIN — LIDOCAINE HYDROCHLORIDE 8 ML: 10 INJECTION, SOLUTION INFILTRATION; PERINEURAL at 15:37

## 2024-11-07 RX ADMIN — METHYLPREDNISOLONE ACETATE 80 MG: 40 INJECTION, SUSPENSION INTRA-ARTICULAR; INTRALESIONAL; INTRAMUSCULAR; SOFT TISSUE at 15:36

## 2024-11-07 NOTE — PROGRESS NOTES
[facility]  History and Physical  Shoulder Pain    Date:  2024    Name:  Lane Borjas  Address:  [address] [address]    :  1945      Age:   79 y.o.    SSN:  [id number]      Medical Record Number:  [id number]    Reason for Visit:    Shoulder Pain (LEFT SHOULDER)      HPI:   Lane Borjas is a 79 y.o. male who presents to our office today for follow up of the chronic left shoulder pain.  Patient has severe glenohumeral joint osteoarthritis of the left shoulder.  He has had this pain for several years and has failed conservative treatment including OTC anti-inflammatories, PT and injection.  The injections are not helping him any longer.  He describes his shoulder pain as constant with any movement and bothersome at night.    Patient had been considering a shoulder replacement procedure however he has had other medical problems that came up in his needing to hold off on that procedure for now.  He plans to have a cardiac ablation done in December.  He will then have to be cleared again for shoulder surgery.  He has a history of R anatomic total shoulder replacement, performed by Dr. Fernandes in 2019. The right shoulder is doing well for him.    Pain Assessment  Location of Pain: Shoulder  Location Modifiers: Left  Severity of Pain: 3  Quality of Pain: Dull, Aching  Frequency of Pain: Intermittent  Aggravating Factors: Other (Comment), Straightening, Stretching  Limiting Behavior: Yes  Relieving Factors: Rest  Work-Related Injury: No  Are there other pain locations you wish to document?: No    No notes on file    Review of Systems:  A 14 point review of systems available in the scanned medical record as documented by the patient and reviewed on 2024.  The review is negative with the exception of those things mentioned in the History of Present Illness and Past Medical History.      Past Medical History:  Patient's medications, allergies,

## 2025-03-10 ENCOUNTER — TELEPHONE (OUTPATIENT)
Dept: ORTHOPEDIC SURGERY | Age: 80
End: 2025-03-10

## 2025-03-10 NOTE — TELEPHONE ENCOUNTER
Surgery and/or Procedure Scheduling     Contact Name: Lane Borjas   Surgical/Procedure Request: LT SHOULDER  Patient Contact Number: [phone]

## 2025-03-11 ENCOUNTER — PREP FOR PROCEDURE (OUTPATIENT)
Dept: ORTHOPEDIC SURGERY | Age: 80
End: 2025-03-11

## 2025-03-11 DIAGNOSIS — M19.012 OSTEOARTHRITIS OF LEFT GLENOHUMERAL JOINT: Primary | ICD-10-CM

## 2025-03-17 ENCOUNTER — TELEPHONE (OUTPATIENT)
Dept: ORTHOPEDIC SURGERY | Age: 80
End: 2025-03-17

## 2025-03-17 NOTE — TELEPHONE ENCOUNTER
Medical Facility Question     Facility Name: [facility]  Contact Name: LUIS  Contact Number: [phone]  Request or Information: PATIENT NEEDS CARDIO CLEARANCE FOR SURGERY.

## 2025-03-20 ENCOUNTER — TELEPHONE (OUTPATIENT)
Dept: ORTHOPEDIC SURGERY | Age: 80
End: 2025-03-20

## 2025-03-20 NOTE — TELEPHONE ENCOUNTER
Orthopedic Nurse Navigator Summary    Patient Name:  Lane Borjas  Anticipated Date of Surgery:  04/07/25  Attended Pre-op Education Class:  Video sent to patient email 03/13/25  PCP: Emilia Martinez MD  Date of PCP visit for H&P: 03/17/25  Is patient in a Pain Management program: Prescribed Ultram 50 mg- takes 1 tab nightly   Review of Medical history reveals history of: HTN, CAD, MI, PVC's, LV dysfunction, Hyperlipidemia, Seronegative arthropathy of multiple sites, Gout, Prostate enlargement, S/P TURP, Aortic root dilatation, GERD, H/O anemia, Cervical and lumbar spinal stenosis, Chronic low back pain, Diverticulitis, Osteoporosis    Critical Lab Values  - Hemoglobin (g/dL):  Date: 03/17/25 Value 12.3  - Hematocrit(%): Date: 03/17/25  Value 38.1  - HgbA1C:  Date: 03/17/25 Value 6.2  - Albumin:  Date: 03/17/25  Value 3.9  - BUN:  Date:  03/17/25 Value 17  - Creatinine:  Date:  03/17/25 Value 1.16    Coronary Artery Disease/HTN/CHF history: Yes  Does the patient see a Cardiologist: Juan Juarez MD  Date of most recent cardiac appt: 03/17/25 clearance note  On any anticoagulation:  Aspirin 81 mg QD    Diabetes History:  No  Most recent HgbA1C: 6.2  Pulmonary:  COPD/Emphysema/Use of home oxygen: No  Alcohol use: No    BMI greater than 40 at time of scheduling:  BMI 26.31  Additional medical concerns:  Additional recommendations for above concerns:  Attended Pre-Hab program:    Anticipated Discharge Disposition:  Home with OPT  Who will be with patient at home following discharge:  wife  Equipment patient already has:  recliner  Bedroom on first or second floor:  first  Bathroom on first or second floor:  first  Weight bearing status:  nwb LUE  Pre-op ambulatory status: uses cane in right hand when ambulating  Number of entry steps: 1    Caregiver assistance:  full time    Christi Saez RN  Date:   03/20/25

## 2025-03-27 ENCOUNTER — OFFICE VISIT (OUTPATIENT)
Dept: ORTHOPEDIC SURGERY | Age: 80
End: 2025-03-27

## 2025-03-27 VITALS — WEIGHT: 212 LBS | HEIGHT: 73 IN | BODY MASS INDEX: 28.1 KG/M2

## 2025-03-27 DIAGNOSIS — M19.012 OSTEOARTHRITIS OF LEFT GLENOHUMERAL JOINT: Primary | ICD-10-CM

## 2025-03-27 NOTE — PROGRESS NOTES
[facility]  History and Physical  Shoulder Pain    Date:  3/27/2025    Name:  Lane Borjas  Address:  [address] [address]    :  1945      Age:   80 y.o.    SSN:  [id number]      Medical Record Number:  [id number]    Reason for Visit:    Shoulder Pain (LEFT SHOULDER)      HPI:   Lane Borjas is a 80 y.o. male who presents to our office today for follow up of the chronic left shoulder pain.  To recap patient has severe glenohumeral joint osteoarthritis of the left shoulder.  He has had this pain for several years and has failed conservative treatment including OTC anti-inflammatories, PT and injection.  The injections are not helping him any longer.  He describes his shoulder pain as constant with any movement and bothersome at night.      He previously wanted to go forward with surgery however the patient was having cardiac issues which precluded him from doing so.  The patient had a injection on 2024 which provided only minimal relief.  Patient has recently got an cleared by his cardiologist Dr. Juarez to move forward with this surgery.      He has a history of R anatomic total shoulder replacement, performed by Dr. Fernandes in 2019. The right shoulder has been doing well for him.    Pain Assessment  Location of Pain: Shoulder  Location Modifiers: Left  Severity of Pain: 7  Quality of Pain: Dull, Sharp, Throbbing, Aching  Duration of Pain: Persistent  Frequency of Pain: Constant  Aggravating Factors: Other (Comment), Straightening, Stretching  Limiting Behavior: Yes  Relieving Factors: Rest, Other (Comment)  Work-Related Injury: No  Are there other pain locations you wish to document?: No    No notes on file    Review of Systems:  A 14 point review of systems available in the scanned medical record as documented by the patient and reviewed on 3/27/2025.  The review is negative with the exception of those things mentioned in the History

## 2025-03-27 NOTE — H&P (VIEW-ONLY)
[facility]  History and Physical  Shoulder Pain    Date:  3/27/2025    Name:  Lane Borjas  Address:  [address] [address]    :  1945      Age:   80 y.o.    SSN:  [id number]      Medical Record Number:  [id number]    Reason for Visit:    Shoulder Pain (LEFT SHOULDER)      HPI:   Lane Borjas is a 80 y.o. male who presents to our office today for follow up of the chronic left shoulder pain.  To recap patient has severe glenohumeral joint osteoarthritis of the left shoulder.  He has had this pain for several years and has failed conservative treatment including OTC anti-inflammatories, PT and injection.  The injections are not helping him any longer.  He describes his shoulder pain as constant with any movement and bothersome at night.      He previously wanted to go forward with surgery however the patient was having cardiac issues which precluded him from doing so.  The patient had a injection on 2024 which provided only minimal relief.  Patient has recently got an cleared by his cardiologist Dr. Juarez to move forward with this surgery.      He has a history of R anatomic total shoulder replacement, performed by Dr. Fernandes in 2019. The right shoulder has been doing well for him.    Pain Assessment  Location of Pain: Shoulder  Location Modifiers: Left  Severity of Pain: 7  Quality of Pain: Dull, Sharp, Throbbing, Aching  Duration of Pain: Persistent  Frequency of Pain: Constant  Aggravating Factors: Other (Comment), Straightening, Stretching  Limiting Behavior: Yes  Relieving Factors: Rest, Other (Comment)  Work-Related Injury: No  Are there other pain locations you wish to document?: No    No notes on file    Review of Systems:  A 14 point review of systems available in the scanned medical record as documented by the patient and reviewed on 3/27/2025.  The review is negative with the exception of those things mentioned in the History

## 2025-04-01 RX ORDER — NITROGLYCERIN 0.3 MG/1
0.3 TABLET SUBLINGUAL EVERY 5 MIN PRN
COMMUNITY
Start: 2025-01-28

## 2025-04-01 RX ORDER — POTASSIUM CHLORIDE 750 MG/1
10 TABLET, EXTENDED RELEASE ORAL DAILY
COMMUNITY
Start: 2024-12-05

## 2025-04-06 ENCOUNTER — ANESTHESIA EVENT (OUTPATIENT)
Dept: OPERATING ROOM | Age: 80
End: 2025-04-06
Payer: MEDICARE

## 2025-04-07 ENCOUNTER — HOSPITAL ENCOUNTER (OUTPATIENT)
Age: 80
Setting detail: OUTPATIENT SURGERY
Discharge: HOME | End: 2025-04-07
Attending: ORTHOPAEDIC SURGERY | Admitting: ORTHOPAEDIC SURGERY
Payer: MEDICARE

## 2025-04-07 ENCOUNTER — ANESTHESIA (OUTPATIENT)
Dept: OPERATING ROOM | Age: 80
End: 2025-04-07
Payer: MEDICARE

## 2025-04-07 ENCOUNTER — APPOINTMENT (OUTPATIENT)
Dept: GENERAL RADIOLOGY | Age: 80
End: 2025-04-07
Attending: ORTHOPAEDIC SURGERY
Payer: MEDICARE

## 2025-04-07 VITALS
BODY MASS INDEX: 26.51 KG/M2 | HEIGHT: 73 IN | WEIGHT: 200 LBS | SYSTOLIC BLOOD PRESSURE: 133 MMHG | OXYGEN SATURATION: 96 % | TEMPERATURE: 97.8 F | HEART RATE: 83 BPM | RESPIRATION RATE: 16 BRPM | DIASTOLIC BLOOD PRESSURE: 88 MMHG

## 2025-04-07 DIAGNOSIS — Z96.612 S/P SHOULDER REPLACEMENT, LEFT: Primary | ICD-10-CM

## 2025-04-07 LAB
ABO/RH: NORMAL
ANTIBODY SCREEN: NORMAL
GLUCOSE BLD-MCNC: 117 MG/DL (ref 70–99)
PERFORMED ON: ABNORMAL

## 2025-04-07 PROCEDURE — 6370000000 HC RX 637 (ALT 250 FOR IP)

## 2025-04-07 PROCEDURE — 2580000003 HC RX 258: Performed by: PHYSICIAN ASSISTANT

## 2025-04-07 PROCEDURE — 7100000001 HC PACU RECOVERY - ADDTL 15 MIN: Performed by: ORTHOPAEDIC SURGERY

## 2025-04-07 PROCEDURE — 6360000002 HC RX W HCPCS: Performed by: ORTHOPAEDIC SURGERY

## 2025-04-07 PROCEDURE — 97530 THERAPEUTIC ACTIVITIES: CPT

## 2025-04-07 PROCEDURE — 86901 BLOOD TYPING SEROLOGIC RH(D): CPT

## 2025-04-07 PROCEDURE — 6360000002 HC RX W HCPCS: Performed by: PHYSICIAN ASSISTANT

## 2025-04-07 PROCEDURE — 2580000003 HC RX 258: Performed by: ANESTHESIOLOGY

## 2025-04-07 PROCEDURE — C1713 ANCHOR/SCREW BN/BN,TIS/BN: HCPCS | Performed by: ORTHOPAEDIC SURGERY

## 2025-04-07 PROCEDURE — 86900 BLOOD TYPING SEROLOGIC ABO: CPT

## 2025-04-07 PROCEDURE — 6360000002 HC RX W HCPCS: Performed by: ANESTHESIOLOGY

## 2025-04-07 PROCEDURE — 3700000001 HC ADD 15 MINUTES (ANESTHESIA): Performed by: ORTHOPAEDIC SURGERY

## 2025-04-07 PROCEDURE — 97162 PT EVAL MOD COMPLEX 30 MIN: CPT

## 2025-04-07 PROCEDURE — 97535 SELF CARE MNGMENT TRAINING: CPT

## 2025-04-07 PROCEDURE — 73020 X-RAY EXAM OF SHOULDER: CPT

## 2025-04-07 PROCEDURE — 7100000010 HC PHASE II RECOVERY - FIRST 15 MIN: Performed by: ORTHOPAEDIC SURGERY

## 2025-04-07 PROCEDURE — 6370000000 HC RX 637 (ALT 250 FOR IP): Performed by: PHYSICIAN ASSISTANT

## 2025-04-07 PROCEDURE — 2500000003 HC RX 250 WO HCPCS: Performed by: ORTHOPAEDIC SURGERY

## 2025-04-07 PROCEDURE — 2500000003 HC RX 250 WO HCPCS

## 2025-04-07 PROCEDURE — 2709999900 HC NON-CHARGEABLE SUPPLY: Performed by: ORTHOPAEDIC SURGERY

## 2025-04-07 PROCEDURE — 3700000000 HC ANESTHESIA ATTENDED CARE: Performed by: ORTHOPAEDIC SURGERY

## 2025-04-07 PROCEDURE — C1776 JOINT DEVICE (IMPLANTABLE): HCPCS | Performed by: ORTHOPAEDIC SURGERY

## 2025-04-07 PROCEDURE — 2500000003 HC RX 250 WO HCPCS: Performed by: PHYSICIAN ASSISTANT

## 2025-04-07 PROCEDURE — 97116 GAIT TRAINING THERAPY: CPT

## 2025-04-07 PROCEDURE — 3600000004 HC SURGERY LEVEL 4 BASE: Performed by: ORTHOPAEDIC SURGERY

## 2025-04-07 PROCEDURE — 2500000003 HC RX 250 WO HCPCS: Performed by: NURSE ANESTHETIST, CERTIFIED REGISTERED

## 2025-04-07 PROCEDURE — 7100000000 HC PACU RECOVERY - FIRST 15 MIN: Performed by: ORTHOPAEDIC SURGERY

## 2025-04-07 PROCEDURE — 6360000002 HC RX W HCPCS

## 2025-04-07 PROCEDURE — 7100000011 HC PHASE II RECOVERY - ADDTL 15 MIN: Performed by: ORTHOPAEDIC SURGERY

## 2025-04-07 PROCEDURE — C1769 GUIDE WIRE: HCPCS | Performed by: ORTHOPAEDIC SURGERY

## 2025-04-07 PROCEDURE — 3600000014 HC SURGERY LEVEL 4 ADDTL 15MIN: Performed by: ORTHOPAEDIC SURGERY

## 2025-04-07 PROCEDURE — 64415 NJX AA&/STRD BRCH PLXS IMG: CPT | Performed by: ANESTHESIOLOGY

## 2025-04-07 PROCEDURE — 97166 OT EVAL MOD COMPLEX 45 MIN: CPT

## 2025-04-07 PROCEDURE — 97110 THERAPEUTIC EXERCISES: CPT

## 2025-04-07 PROCEDURE — 86850 RBC ANTIBODY SCREEN: CPT

## 2025-04-07 DEVICE — ALTIVATE ANATOMIC AG, PEGGED GLENOID SZ54, FW-7 LEFT, EPLUS
Type: IMPLANTABLE DEVICE | Site: SHOULDER | Status: FUNCTIONAL
Brand: DJO SURGICAL

## 2025-04-07 DEVICE — IMPL SHLDR HEAD HUMERAL NEU 20MM 54MM: Type: IMPLANTABLE DEVICE | Site: SHOULDER | Status: FUNCTIONAL

## 2025-04-07 DEVICE — IMPL CAPPED SHOULDER S2 TOTAL ADV ANATOMIC DJO: Type: IMPLANTABLE DEVICE | Site: SHOULDER | Status: FUNCTIONAL

## 2025-04-07 DEVICE — COBALT G-HV BONE CEMENT 40GM
Type: IMPLANTABLE DEVICE | Site: SHOULDER | Status: FUNCTIONAL
Brand: DJO SURGICAL

## 2025-04-07 RX ORDER — LIDOCAINE HYDROCHLORIDE 20 MG/ML
INJECTION, SOLUTION INTRAVENOUS
Status: DISCONTINUED | OUTPATIENT
Start: 2025-04-07 | End: 2025-04-07 | Stop reason: SDUPTHER

## 2025-04-07 RX ORDER — SODIUM CHLORIDE 0.9 % (FLUSH) 0.9 %
5-40 SYRINGE (ML) INJECTION EVERY 12 HOURS SCHEDULED
Status: DISCONTINUED | OUTPATIENT
Start: 2025-04-07 | End: 2025-04-07 | Stop reason: HOSPADM

## 2025-04-07 RX ORDER — HYDROMORPHONE HYDROCHLORIDE 1 MG/ML
0.5 INJECTION, SOLUTION INTRAMUSCULAR; INTRAVENOUS; SUBCUTANEOUS EVERY 10 MIN PRN
Status: DISCONTINUED | OUTPATIENT
Start: 2025-04-07 | End: 2025-04-07 | Stop reason: HOSPADM

## 2025-04-07 RX ORDER — ROCURONIUM BROMIDE 10 MG/ML
INJECTION, SOLUTION INTRAVENOUS
Status: DISCONTINUED | OUTPATIENT
Start: 2025-04-07 | End: 2025-04-07 | Stop reason: SDUPTHER

## 2025-04-07 RX ORDER — CELECOXIB 200 MG/1
400 CAPSULE ORAL ONCE
Status: COMPLETED | OUTPATIENT
Start: 2025-04-07 | End: 2025-04-07

## 2025-04-07 RX ORDER — SODIUM CHLORIDE 0.9 % (FLUSH) 0.9 %
5-40 SYRINGE (ML) INJECTION PRN
Status: DISCONTINUED | OUTPATIENT
Start: 2025-04-07 | End: 2025-04-07 | Stop reason: HOSPADM

## 2025-04-07 RX ORDER — VANCOMYCIN HYDROCHLORIDE 1 G/20ML
INJECTION, POWDER, LYOPHILIZED, FOR SOLUTION INTRAVENOUS PRN
Status: DISCONTINUED | OUTPATIENT
Start: 2025-04-07 | End: 2025-04-07 | Stop reason: HOSPADM

## 2025-04-07 RX ORDER — SODIUM CHLORIDE 9 MG/ML
INJECTION, SOLUTION INTRAVENOUS PRN
Status: DISCONTINUED | OUTPATIENT
Start: 2025-04-07 | End: 2025-04-07 | Stop reason: HOSPADM

## 2025-04-07 RX ORDER — BUPIVACAINE HYDROCHLORIDE 5 MG/ML
INJECTION, SOLUTION EPIDURAL; INTRACAUDAL; PERINEURAL
Status: COMPLETED
Start: 2025-04-07 | End: 2025-04-07

## 2025-04-07 RX ORDER — MIDAZOLAM HYDROCHLORIDE 1 MG/ML
INJECTION, SOLUTION INTRAMUSCULAR; INTRAVENOUS
Status: DISCONTINUED | OUTPATIENT
Start: 2025-04-07 | End: 2025-04-07 | Stop reason: SDUPTHER

## 2025-04-07 RX ORDER — HYDROMORPHONE HYDROCHLORIDE 1 MG/ML
0.5 INJECTION, SOLUTION INTRAMUSCULAR; INTRAVENOUS; SUBCUTANEOUS EVERY 5 MIN PRN
Status: DISCONTINUED | OUTPATIENT
Start: 2025-04-07 | End: 2025-04-07 | Stop reason: HOSPADM

## 2025-04-07 RX ORDER — ACETAMINOPHEN 500 MG
1000 TABLET ORAL ONCE
Status: COMPLETED | OUTPATIENT
Start: 2025-04-07 | End: 2025-04-07

## 2025-04-07 RX ORDER — SODIUM CHLORIDE, SODIUM LACTATE, POTASSIUM CHLORIDE, CALCIUM CHLORIDE 600; 310; 30; 20 MG/100ML; MG/100ML; MG/100ML; MG/100ML
INJECTION, SOLUTION INTRAVENOUS CONTINUOUS
Status: DISCONTINUED | OUTPATIENT
Start: 2025-04-07 | End: 2025-04-07 | Stop reason: HOSPADM

## 2025-04-07 RX ORDER — LABETALOL HYDROCHLORIDE 5 MG/ML
10 INJECTION, SOLUTION INTRAVENOUS
Status: DISCONTINUED | OUTPATIENT
Start: 2025-04-07 | End: 2025-04-07 | Stop reason: HOSPADM

## 2025-04-07 RX ORDER — KETAMINE HCL IN NACL, ISO-OSM 20 MG/2 ML
SYRINGE (ML) INJECTION
Status: DISCONTINUED | OUTPATIENT
Start: 2025-04-07 | End: 2025-04-07 | Stop reason: SDUPTHER

## 2025-04-07 RX ORDER — NALOXONE HYDROCHLORIDE 0.4 MG/ML
INJECTION, SOLUTION INTRAMUSCULAR; INTRAVENOUS; SUBCUTANEOUS PRN
Status: DISCONTINUED | OUTPATIENT
Start: 2025-04-07 | End: 2025-04-07 | Stop reason: HOSPADM

## 2025-04-07 RX ORDER — LIDOCAINE HYDROCHLORIDE 40 MG/ML
SOLUTION TOPICAL
Status: DISCONTINUED | OUTPATIENT
Start: 2025-04-07 | End: 2025-04-07 | Stop reason: SDUPTHER

## 2025-04-07 RX ORDER — PHENYLEPHRINE HCL IN 0.9% NACL 1 MG/10 ML
SYRINGE (ML) INTRAVENOUS
Status: DISCONTINUED | OUTPATIENT
Start: 2025-04-07 | End: 2025-04-07 | Stop reason: SDUPTHER

## 2025-04-07 RX ORDER — METOCLOPRAMIDE HYDROCHLORIDE 5 MG/ML
10 INJECTION INTRAMUSCULAR; INTRAVENOUS
Status: DISCONTINUED | OUTPATIENT
Start: 2025-04-07 | End: 2025-04-07 | Stop reason: HOSPADM

## 2025-04-07 RX ORDER — ONDANSETRON 4 MG/1
4 TABLET, FILM COATED ORAL 3 TIMES DAILY PRN
Qty: 15 TABLET | Refills: 0 | Status: SHIPPED | OUTPATIENT
Start: 2025-04-07

## 2025-04-07 RX ORDER — PREGABALIN 150 MG/1
150 CAPSULE ORAL ONCE
Status: COMPLETED | OUTPATIENT
Start: 2025-04-07 | End: 2025-04-07

## 2025-04-07 RX ORDER — EPHEDRINE SULFATE 50 MG/ML
INJECTION INTRAVENOUS
Status: DISCONTINUED | OUTPATIENT
Start: 2025-04-07 | End: 2025-04-07 | Stop reason: SDUPTHER

## 2025-04-07 RX ORDER — MIDAZOLAM HYDROCHLORIDE 1 MG/ML
INJECTION, SOLUTION INTRAMUSCULAR; INTRAVENOUS
Status: COMPLETED
Start: 2025-04-07 | End: 2025-04-07

## 2025-04-07 RX ORDER — FENTANYL CITRATE 50 UG/ML
INJECTION, SOLUTION INTRAMUSCULAR; INTRAVENOUS
Status: COMPLETED
Start: 2025-04-07 | End: 2025-04-07

## 2025-04-07 RX ORDER — BUPIVACAINE HYDROCHLORIDE 5 MG/ML
INJECTION, SOLUTION EPIDURAL; INTRACAUDAL; PERINEURAL
Status: COMPLETED | OUTPATIENT
Start: 2025-04-07 | End: 2025-04-07

## 2025-04-07 RX ORDER — HYDRALAZINE HYDROCHLORIDE 20 MG/ML
10 INJECTION INTRAMUSCULAR; INTRAVENOUS
Status: DISCONTINUED | OUTPATIENT
Start: 2025-04-07 | End: 2025-04-07 | Stop reason: HOSPADM

## 2025-04-07 RX ORDER — DEXAMETHASONE SODIUM PHOSPHATE 4 MG/ML
INJECTION, SOLUTION INTRA-ARTICULAR; INTRALESIONAL; INTRAMUSCULAR; INTRAVENOUS; SOFT TISSUE
Status: DISCONTINUED | OUTPATIENT
Start: 2025-04-07 | End: 2025-04-07 | Stop reason: SDUPTHER

## 2025-04-07 RX ORDER — HYDROCODONE BITARTRATE AND ACETAMINOPHEN 5; 325 MG/1; MG/1
1 TABLET ORAL EVERY 6 HOURS PRN
Qty: 28 TABLET | Refills: 0 | Status: SHIPPED | OUTPATIENT
Start: 2025-04-07 | End: 2025-04-14

## 2025-04-07 RX ORDER — DIPHENHYDRAMINE HYDROCHLORIDE 50 MG/ML
12.5 INJECTION, SOLUTION INTRAMUSCULAR; INTRAVENOUS
Status: DISCONTINUED | OUTPATIENT
Start: 2025-04-07 | End: 2025-04-07 | Stop reason: HOSPADM

## 2025-04-07 RX ORDER — PROPOFOL 10 MG/ML
INJECTION, EMULSION INTRAVENOUS
Status: DISCONTINUED | OUTPATIENT
Start: 2025-04-07 | End: 2025-04-07 | Stop reason: SDUPTHER

## 2025-04-07 RX ORDER — DOXYCYCLINE HYCLATE 100 MG
100 TABLET ORAL 2 TIMES DAILY
Qty: 10 TABLET | Refills: 0 | Status: SHIPPED | OUTPATIENT
Start: 2025-04-07 | End: 2025-04-12

## 2025-04-07 RX ORDER — MEPERIDINE HYDROCHLORIDE 25 MG/ML
12.5 INJECTION INTRAMUSCULAR; INTRAVENOUS; SUBCUTANEOUS EVERY 5 MIN PRN
Status: DISCONTINUED | OUTPATIENT
Start: 2025-04-07 | End: 2025-04-07 | Stop reason: HOSPADM

## 2025-04-07 RX ORDER — ASPIRIN 325 MG
325 TABLET ORAL 2 TIMES DAILY
Qty: 28 TABLET | Refills: 0 | Status: SHIPPED | OUTPATIENT
Start: 2025-04-07 | End: 2025-04-21

## 2025-04-07 RX ORDER — FENTANYL CITRATE 50 UG/ML
INJECTION, SOLUTION INTRAMUSCULAR; INTRAVENOUS
Status: DISCONTINUED | OUTPATIENT
Start: 2025-04-07 | End: 2025-04-07 | Stop reason: SDUPTHER

## 2025-04-07 RX ORDER — CLINDAMYCIN PHOSPHATE 900 MG/50ML
900 INJECTION, SOLUTION INTRAVENOUS ONCE
Status: COMPLETED | OUTPATIENT
Start: 2025-04-07 | End: 2025-04-07

## 2025-04-07 RX ORDER — SENNOSIDES 8.6 MG
1 TABLET ORAL DAILY
Qty: 30 TABLET | Refills: 0 | Status: SHIPPED | OUTPATIENT
Start: 2025-04-07

## 2025-04-07 RX ORDER — GLYCOPYRROLATE 0.2 MG/ML
INJECTION INTRAMUSCULAR; INTRAVENOUS
Status: DISCONTINUED | OUTPATIENT
Start: 2025-04-07 | End: 2025-04-07 | Stop reason: SDUPTHER

## 2025-04-07 RX ORDER — LIDOCAINE HYDROCHLORIDE 10 MG/ML
1 INJECTION, SOLUTION EPIDURAL; INFILTRATION; INTRACAUDAL; PERINEURAL
Status: DISCONTINUED | OUTPATIENT
Start: 2025-04-07 | End: 2025-04-07 | Stop reason: HOSPADM

## 2025-04-07 RX ADMIN — SODIUM CHLORIDE, SODIUM LACTATE, POTASSIUM CHLORIDE, AND CALCIUM CHLORIDE: .6; .31; .03; .02 INJECTION, SOLUTION INTRAVENOUS at 13:01

## 2025-04-07 RX ADMIN — LIDOCAINE HYDROCHLORIDE 100 MG: 20 INJECTION, SOLUTION INTRAVENOUS at 13:45

## 2025-04-07 RX ADMIN — MIDAZOLAM HYDROCHLORIDE 1 MG: 1 INJECTION, SOLUTION INTRAMUSCULAR; INTRAVENOUS at 13:20

## 2025-04-07 RX ADMIN — BUPIVACAINE 10 ML: 13.3 INJECTION, SUSPENSION, LIPOSOMAL INFILTRATION at 13:23

## 2025-04-07 RX ADMIN — Medication 50 MCG: at 13:58

## 2025-04-07 RX ADMIN — PREGABALIN 150 MG: 150 CAPSULE ORAL at 13:00

## 2025-04-07 RX ADMIN — PROPOFOL 50 MG: 10 INJECTION, EMULSION INTRAVENOUS at 13:46

## 2025-04-07 RX ADMIN — SUGAMMADEX 100 MG: 100 INJECTION, SOLUTION INTRAVENOUS at 15:41

## 2025-04-07 RX ADMIN — EPHEDRINE SULFATE 5 MG: 50 INJECTION INTRAVENOUS at 14:10

## 2025-04-07 RX ADMIN — DEXAMETHASONE SODIUM PHOSPHATE 4 MG: 4 INJECTION INTRA-ARTICULAR; INTRALESIONAL; INTRAMUSCULAR; INTRAVENOUS; SOFT TISSUE at 13:56

## 2025-04-07 RX ADMIN — LIDOCAINE HYDROCHLORIDE 4 ML: 40 SOLUTION TOPICAL at 13:48

## 2025-04-07 RX ADMIN — FENTANYL CITRATE 50 MCG: 50 INJECTION, SOLUTION INTRAMUSCULAR; INTRAVENOUS at 13:20

## 2025-04-07 RX ADMIN — ROCURONIUM BROMIDE 25 MG: 10 INJECTION, SOLUTION INTRAVENOUS at 14:24

## 2025-04-07 RX ADMIN — Medication 20 MG: at 14:30

## 2025-04-07 RX ADMIN — FENTANYL CITRATE 50 MCG: 50 INJECTION, SOLUTION INTRAMUSCULAR; INTRAVENOUS at 13:16

## 2025-04-07 RX ADMIN — TRANEXAMIC ACID 1000 MG: 100 INJECTION, SOLUTION INTRAVENOUS at 14:01

## 2025-04-07 RX ADMIN — EPHEDRINE SULFATE 5 MG: 50 INJECTION INTRAVENOUS at 14:42

## 2025-04-07 RX ADMIN — PROPOFOL 50 MG: 10 INJECTION, EMULSION INTRAVENOUS at 13:45

## 2025-04-07 RX ADMIN — PROPOFOL 30 MG: 10 INJECTION, EMULSION INTRAVENOUS at 14:24

## 2025-04-07 RX ADMIN — Medication 100 MCG: at 14:07

## 2025-04-07 RX ADMIN — SUGAMMADEX 100 MG: 100 INJECTION, SOLUTION INTRAVENOUS at 15:42

## 2025-04-07 RX ADMIN — SODIUM CHLORIDE 1500 MG: 0.9 INJECTION, SOLUTION INTRAVENOUS at 13:12

## 2025-04-07 RX ADMIN — CELECOXIB 400 MG: 200 CAPSULE ORAL at 13:00

## 2025-04-07 RX ADMIN — Medication 150 MCG: at 13:59

## 2025-04-07 RX ADMIN — BUPIVACAINE HYDROCHLORIDE 20 ML: 5 INJECTION, SOLUTION EPIDURAL; INTRACAUDAL; PERINEURAL at 13:23

## 2025-04-07 RX ADMIN — GLYCOPYRROLATE 0.1 MG: 0.2 INJECTION INTRAMUSCULAR; INTRAVENOUS at 14:07

## 2025-04-07 RX ADMIN — CLINDAMYCIN PHOSPHATE 900 MG: 900 INJECTION, SOLUTION INTRAVENOUS at 13:52

## 2025-04-07 RX ADMIN — ACETAMINOPHEN 1000 MG: 500 TABLET ORAL at 13:00

## 2025-04-07 RX ADMIN — ROCURONIUM BROMIDE 50 MG: 10 INJECTION, SOLUTION INTRAVENOUS at 13:46

## 2025-04-07 RX ADMIN — GLYCOPYRROLATE 0.1 MG: 0.2 INJECTION INTRAMUSCULAR; INTRAVENOUS at 13:58

## 2025-04-07 RX ADMIN — MIDAZOLAM HYDROCHLORIDE 1 MG: 1 INJECTION, SOLUTION INTRAMUSCULAR; INTRAVENOUS at 13:16

## 2025-04-07 ASSESSMENT — PAIN DESCRIPTION - LOCATION: LOCATION: ARM

## 2025-04-07 ASSESSMENT — PAIN - FUNCTIONAL ASSESSMENT: PAIN_FUNCTIONAL_ASSESSMENT: PREVENTS OR INTERFERES WITH ALL ACTIVE AND SOME PASSIVE ACTIVITIES

## 2025-04-07 ASSESSMENT — PAIN SCALES - GENERAL
PAINLEVEL_OUTOF10: 0
PAINLEVEL_OUTOF10: 5
PAINLEVEL_OUTOF10: 0

## 2025-04-07 ASSESSMENT — PAIN DESCRIPTION - ORIENTATION: ORIENTATION: LEFT

## 2025-04-07 ASSESSMENT — PAIN DESCRIPTION - ONSET: ONSET: ON-GOING

## 2025-04-07 ASSESSMENT — PAIN DESCRIPTION - DESCRIPTORS: DESCRIPTORS: ACHING;DISCOMFORT

## 2025-04-07 ASSESSMENT — PAIN DESCRIPTION - PAIN TYPE: TYPE: CHRONIC PAIN

## 2025-04-07 ASSESSMENT — PAIN DESCRIPTION - FREQUENCY: FREQUENCY: CONTINUOUS

## 2025-04-07 NOTE — INTERVAL H&P NOTE
Update History & Physical    The patient's History and Physical of March 27, 2025 was reviewed with the patient and I examined the patient. There was no change. The surgical site was confirmed by the patient and me.     Plan: The risks, benefits, expected outcome, and alternative to the recommended procedure have been discussed with the patient. Patient understands and wants to proceed with the procedure.     Electronically signed by Sukhjinder Conroy DO on 4/7/2025 at 1:48 PM

## 2025-04-07 NOTE — BRIEF OP NOTE
Brief Postoperative Note      Patient: Lane Borjas  YOB: 1945  MRN: [id number]    Date of Procedure: 4/7/2025    Pre-Op Diagnosis Codes:      * Arthritis of left shoulder region [M19.012]    Post-Op Diagnosis: Same       Procedure(s):  LEFT  SHOULDER OPEN BICEPS TENODESIS, ANATOMIC TOTAL SHOULDER REPLACEMENT USING AUGMENT GLENOID    Surgeon(s):  Alecia Fernandes MD    Assistant:  Surgical Assistant: Clif Johnson  Fellow: Sukhjinder Conroy DO  Resident: Devika Vinson DO    Anesthesia: General    Estimated Blood Loss (mL): 300     Complications: None    Specimens:   * No specimens in log *    Implants:  * No implants in log *      Drains: * No LDAs found *    Findings:  Infection Present At Time Of Surgery (PATOS) (choose all levels that have infection present):  No infection present  Other Findings: please refer to op note    Electronically signed by Sukhjinder Conroy DO on 4/7/2025 at 3:34 PM

## 2025-04-07 NOTE — PROGRESS NOTES
-Pt arrived to \[facility]\"" for with wife Rose Mary.  -Patient is resting in bed with call light.  -Antibiotic on hold to OR- Vancomycin started in preop. Clindamycin on hold to OR.  -Pt belongings bag-2 bags to locked room, sling to OR, wife has cane to car  -IV patent w/ IV fluids running.    -Labs sent- Type and Screen  -CHG wipes- yes Left shoulder  -BILLY hose- bilateral knee high  -IS teaching- completed 2500 reached.  -Joint Video completed- No. The patient reported he did not want to watch the video. He had the right shoulder replaced about 7 years ago.   -This patient has poor balance. He uses a cane for stability. His right leg in numb from the shin down due to a back injury. Left foot has some numbness.   
4/1/2025 0959 am:    . TJ book, IS instructions, TJ video link, and fall contract placed on chart for DOS/ts.    Total Joint video emailed FROM NURSE NAVIGATOR & PT HAS NOT WATCHED AT THIS TIME. TOTAL JOINT VIDEO LINK ON CHART AND CAN BE WATCHED DOS.. Hibiclens instructions reviewed to use x 5 days preop.  SIOBHAN screening done/TS    Stop BANG SCORE 4. SLEEP APNEA INFORMATION CARD PLACED ON CHART FOR DOS/TS    
4/1/2025 1030 AM:  Total Joint Same Day Readiness Screen 2.0  PAT Questionnaire    Does patient have at least one day of 24 hr assist of capable caregiver at d/c?  [x] Yes = 0  [] No = 6    Was patient using an assistive device to walk prior to surgery?  [] No = 0  [x] Yes = 4    How many steps do you have to get to the floor where you plan to initially sleep and use the restroom? (At least 1/2 bath)  [x] 0-2 steps= 0 [] 3+ steps = 1    Has patient fallen in the last 3 months? If yes, how many times?  [x] 0 falls = 0 [] 1+ fall = 1    [] 2+ falls = 4    Does patient have a hx of post-op nausea/vomiting?  [x] No = 0 [] Yes = 2  
4/1/2025 1031 AM:     PRE-OP ORDERS/CONSENT TO BE ENTERED BY SURGEON'S OFFICE/TS.      TI COMPLETED- including review of medications, allergies, medical history, need for ride home from family/friend/caregiver, needs caregiver for at least 24 hours after procedure, notify surgeon office of any changes in health, follow all instructions from surgeon office and watch  Total Joint video link and shower with Hibiclens/ts.     H&P IN MEDIA 3/21/2025 AND CE 3/17/2025, LABS IN CE 3/17/2025, AND CALLED MARK BARAJAS E EKG TRACING [phone]/TS   
Physical Therapy  Facility/Department: [facility] GENERAL SURGERY  Physical Therapy Initial Assessment, Treatment and D/c    Name: Lane Borjas  : 1945  MRN: [id number]  Date of Service: 2025    Discharge Recommendations:  24 hour supervision or assist, Therapy recommended at discharge   PT Equipment Recommendations  Equipment Needed: No    (Patient/Family) educated on shoulder immobilizer; including purpose, donning/doffing, fit/positioning, and wear schedule as ordered by surgical team. (Patient/Family) verbalized and performed return demonstration, confirming understanding.       Patient Diagnosis(es): The encounter diagnosis was S/P shoulder replacement, left.  Past Medical History:  has a past medical history of CAD (coronary artery disease), Does mobilize using cane, HTN (hypertension), Hyperlipidemia, MI, old, Osteoarthritis, PVC (premature ventricular contraction), and Wears glasses.  Past Surgical History:  has a past surgical history that includes hernia repair; knee surgery; Spinal fusion (); joint replacement (Left); joint replacement (Right); Tonsillectomy; Cholecystectomy; Total shoulder arthroplasty (Right); and eye surgery.    Assessment  Assessment: Pt moving well POD 0.  Rec 24 hour assist initially and PT.  No DME needs.  D/c PT.  Therapy Prognosis: Good  Decision Making: Low Complexity  Requires PT Follow-Up: Yes    Plan  Safety Devices  Type of Devices: Left in bed, Nurse notified (In PACU)    Restrictions  Position Activity Restriction  Other Position/Activity Restrictions: L shoulder immobilizer; NWB L UE     Subjective  General  Chart Reviewed: Yes  Additional Pertinent Hx: Pt is 79 yo M to [facility] on 25 for OA L shoulder and underwent L TSR  Family/Caregiver Present: No  Referring Practitioner: Dr. Conroy  Diagnosis: OA  Subjective  Subjective: Pt supine in bed and ageeable to PT.  Denies pain.  States his balance is off due to back surgery.    Social/Functional 
Procedure: LEFT shoulder peripheral block  MD: Dr. Mims  Timeout performed at 1315.  Pt monitored closely on heart monitor, 2L NC, continuous pulse oximetry, EtCO2, and frequent BPs.   Pt remained alert and oriented x4. pt tolerated procedure well.  
Total Joint Same Day Readiness Screen 2.0  PAT Questionnaire    Does patient have at least one day of 24 hr assist of capable caregiver at d/c?  [x] Yes = 0  [] No = 6    Was patient using an assistive device to walk prior to surgery?  [] No = 0  [x] Yes = 4    How many steps do you have to get to the floor where you plan to initially sleep and use the restroom? (At least 1/2 bath)  [x] 0-2 steps= 0 [] 3+ steps = 1    Has patient fallen in the last 3 months? If yes, how many times?  [] 0 falls = 0 [] 1+ fall = 1    [x] 2+ falls = 4    Does patient have a hx of post-op nausea/vomiting?  [x] No = 0 [] Yes = 2    Other Factors    Age  [] <70 = 0 [x] 71-79 = 1  [] 80+ = 2                     BMI  [x] <30 = 0       []31-39 = 1    [] >40 = 2    Pertinent co-morbidities (consider cardiopulmonary, cardiovascular, neurological, and psychiatric diagnoses)  [] 0 = 0           [] 1-2 = 2       [x] 3+ = 4    Sleep apnea  [x] No = 0         [] Yes = 1    Hx of prolonged emergence from general anesthesia  [x] No = 0         [] Yes = 3      Score: 13      Interpretation:  Red (10-29): Low probability of safe same day discharge  Yellow (6-9): Moderate probability of safe same day discharge  Green (0-5): High probability of safe same day discharge    Score completed by:      
    If you have any questions, please call and speak with a [facility] PreAdmission Testing   Nurse at [phone].     
clothing?: A Lot  How much help is needed for taking care of personal grooming?: A Little  How much help for eating meals?: A Little  AM-PAC Inpatient Daily Activity Raw Score: 16  AM-PAC Inpatient ADL T-Scale Score : 35.96  ADL Inpatient CMS 0-100% Score: 53.32  ADL Inpatient CMS G-Code Modifier : CK      Safety Devices  Type of Devices: Left in bed;Nurse notified (In PACU)    Therapy Time   Individual Concurrent Group Co-treatment   Time In 1721         Time Out 1759         Minutes 38         Timed Code Treatment Minutes: 23 Minutes   Total Treatment Time: 38 minutes    Samanta Pittman, OT     
PRE OP HISTORY AND PHYSICAL CAN BE FAXED [phone].    Instructions reviewed with patient during preadmission testing phone interview.  Marimar Romero RN.4/1/2025 .10:32 AM      ADDITIONAL EDUCATIONAL INFORMATION REVIEWED PER PHONE WITH YOU AND/OR YOUR FAMILY:  Yes Hibiclens® Bathing Instructions REVIEWED INSTRUCTIONS FOR PROPER USE FOR 5 DAYS PRIOR TO PROCEDURE INCLUDING DOS/TS    No Antibacterial Soap

## 2025-04-07 NOTE — FLOWSHEET NOTE
Patient received from the OR to PACU #11 post LEFT SHOULDER OPEN BICEPS TENODESIS, ANATOMIC TOTAL SHOULDER REPLACEMENT USING AUGMENT GLENOID - Left of Dr. Fernandes. Placed on PACU monitoring equipment. Report given per CRNA and OR RN. Per report, patient required light BP support intra op. Had pre op block with Exparel and band is in place. On arrival, patient is wakeful, asking for a drink and is on RA. Denies pain.

## 2025-04-07 NOTE — FLOWSHEET NOTE
Ambulatory Surgery/Procedure Discharge Note    Vitals:    04/07/25 1800   BP: 133/88   Pulse: 83   Resp: 16   Temp: 97.8 °F (36.6 °C)   SpO2: 96%       In: 110   Out: 300     Restroom use offered before discharge.  Yes    Pain assessment:  none, had pre op block and no complaints of pain  Pain Level: 0    Discharge instructions reviewed with patient and his wife, Rose Mary. Copy of instructions given. Discharged with IS, immobilizer and knee hi TEDS on. Has cane from home.      Patient discharged to home/self care after passing OT/PT. Patient discharged via wheel chair by this RN to waiting family/S.O.       4/7/2025 6:33 PM

## 2025-04-07 NOTE — ANESTHESIA POSTPROCEDURE EVALUATION
Department of Anesthesiology  Postprocedure Note    Patient: Lane Borjas  MRN: [id number]  YOB: 1945  Date of evaluation: 4/7/2025    Procedure Summary       Date: 04/07/25 Room / Location: [place] [address] [facility]    Anesthesia Start: 1340 Anesthesia Stop: 1603    Procedure: LEFT  SHOULDER OPEN BICEPS TENODESIS, ANATOMIC TOTAL SHOULDER REPLACEMENT USING AUGMENT GLENOID (Left: Shoulder) Diagnosis:       Arthritis of left shoulder region      (Arthritis of left shoulder region [M19.012])    Surgeons: Alecia Fernandes MD Responsible Provider: Britton Mims MD    Anesthesia Type: general ASA Status: 3            Anesthesia Type: No value filed.    Nikolas Phase I: Nikolas Score: 10    Nikolas Phase II:      Anesthesia Post Evaluation    Patient location during evaluation: PACU  Patient participation: complete - patient participated  Level of consciousness: awake  Pain score: 0  Nausea & Vomiting: no nausea and no vomiting  Cardiovascular status: blood pressure returned to baseline  Respiratory status: acceptable  Hydration status: euvolemic  Pain management: adequate    No notable events documented.

## 2025-04-07 NOTE — DISCHARGE INSTRUCTIONS
site   Unrelieved PAIN  Unrelieved NAUSEA  Blood soaked dressing.  (Some oozing may be normal)  Inability to urinate      Numb, pale, blue, cold or tingling extremity      Physician:  Dr. Fernandes    The above instructions were reviewed with patient/significant other.  The following additional patient specific information was reviewed with the patient/significant other:  [x]Procedure/physician specific instructions  []Medication information sheet(S) including potential side effects  []Annika’s egress test  []Pain Ball management  []FAQ Catheter associated blood stream infections  [x]FAQ Surgical Site Infections  [x]Other-Post nerve block/Exparel Instructions    I have read and understand the instructions given to me: ____________________________________________   (Patient/S.O. Signature)            Date/time 4/7/2025 3:52 PM                 If you smoke STOP. We care about your health!

## 2025-04-07 NOTE — ANESTHESIA PROCEDURE NOTES
Peripheral Block    Patient location during procedure: pre-op  Reason for block: post-op pain management and at surgeon's request  Start time: 4/7/2025 1:16 PM  End time: 4/7/2025 1:24 PM  Staffing  Performed: anesthesiologist   Anesthesiologist: Britton Mims MD  Performed by: Britton Mims MD  Authorized by: Britton Mims MD    Preanesthetic Checklist  Completed: patient identified, IV checked, site marked, risks and benefits discussed, surgical/procedural consents, equipment checked, pre-op evaluation, timeout performed, anesthesia consent given, oxygen available and monitors applied/VS acknowledged  Peripheral Block   Patient position: sitting  Prep: ChloraPrep  Provider prep: mask and sterile gloves  Patient monitoring: cardiac monitor, continuous pulse ox, frequent blood pressure checks, IV access, responsive to questions, oxygen and continuous capnometry  Block type: Brachial plexus  Interscalene  Laterality: left  Injection technique: single-shot  Guidance: ultrasound guided    Needle   Needle type: insulated echogenic nerve stimulator needle   Needle gauge: 22 G  Needle localization: ultrasound guidance  Needle length: 8 cm  Assessment   Injection assessment: negative aspiration for heme, no paresthesia on injection, local visualized surrounding nerve on ultrasound and no intravascular symptoms  Paresthesia pain: none  Slow fractionated injection: yes  Hemodynamics: stable  Outcomes: uncomplicated and patient tolerated procedure well    Additional Notes  Immediately prior to procedure a \"time out\" was called to verify the correct patient, allergies, laterality, procedure and equipment. Time out performed with RN at 1315.    Local Anesthetic: See below    Anterior scalene and middle scalene muscles, upper, middle and lower trunks of the brachial plexus are identified, the tip of the needle and the spread of the local anesthetic around the brachial plexus are visualized. The Brachial plexus

## 2025-04-07 NOTE — ANESTHESIA PRE PROCEDURE
capsule Take 1 capsule by mouth daily 11/24/18   Provider, MD Nathalie       Current medications:    Current Facility-Administered Medications   Medication Dose Route Frequency Provider Last Rate Last Admin   • celecoxib (CELEBREX) capsule 400 mg  400 mg Oral Once Nagar, Sushma, PA-C       • pregabalin (LYRICA) capsule 150 mg  150 mg Oral Once Nagar, Sushma, PA-C       • tranexamic acid (CYKLOKAPRON) 1,000 mg in sodium chloride 0.9 % 60 mL IVPB  1,000 mg IntraVENous Once Nagar, Sushma, PA-C       • sodium chloride flush 0.9 % injection 5-40 mL  5-40 mL IntraVENous 2 times per day Nagar, Sushma, PA-C       • sodium chloride flush 0.9 % injection 5-40 mL  5-40 mL IntraVENous PRN Nagar, Sushma, PA-C       • 0.9 % sodium chloride infusion   IntraVENous PRN Nagar, Sushma, PA-C       • vancomycin (VANCOCIN) 1,500 mg in sodium chloride 0.9 % 250 mL IVPB (Mahf4Hiv)  1,500 mg IntraVENous On Call to OR Nagar, Sushma, PA-C       • acetaminophen (TYLENOL) tablet 1,000 mg  1,000 mg Oral Once Nagar, Sushma, PA-C       • clindamycin (CLEOCIN) 900 mg in dextrose 5 % 50 mL IVPB  900 mg IntraVENous Once Nagar, Sushma, PA-C       • lidocaine PF 1 % injection 1 mL  1 mL IntraDERmal Once PRN Damion Willett DO       • lactated ringers infusion   IntraVENous Continuous Damion Willett DO           Allergies:    Allergies   Allergen Reactions   • Amoxicillin-Pot Clavulanate Diarrhea   • Ezetimibe-Simvastatin Other (See Comments)     Did not feel well on med       Problem List:    Patient Active Problem List   Diagnosis Code   • Acute idiopathic gout of right foot M10.071   • Cardiomyopathy I42.9   • Chronic low back pain M54.50, G89.29   • Essential hypertension I10   • Hypercholesterolemia E78.00   • LV dysfunction I51.9   • Prostate enlargement N40.0   • Primary localized osteoarthrosis of shoulder region M19.019   • S/P knee replacement Z96.659   • S/P shoulder surgery Z98.890   • H/O total shoulder replacement, right Z96.611

## 2025-04-08 ENCOUNTER — TELEPHONE (OUTPATIENT)
Dept: ORTHOPEDIC SURGERY | Age: 80
End: 2025-04-08

## 2025-04-08 NOTE — TELEPHONE ENCOUNTER
Spoke with patient.  He is doing okay today after Left shoulder surgery yesterday.    Incision status: No drainage or redness    Edema/Swelling/Teds: We discussed icing for swelling.  He is wearing BILLY hose.    Pain level and status: No complaints of current pain as his block is still active.      Use of pain medications: Norco 5/325 mg q6h prn.  Not currently taking but he will start taking as block starts to wear off.    Blood thinner: Aspirin 325 mg BID    Bowels: No BM since surgery.  He has Senokot and is drinking plenty of water.  He is urinating without issues.    Home Care Agency active: No    Outpatient therapy: Starts therapy 04/10/25 at [address] in [place].    Do you have all of your medications: Yes    Changes in medications: No    Instructed pt to call Nurse Navigator or surgeon's office with any questions or concerns.     Follow up appointments:    Future Appointments   Date Time Provider Department Center   4/17/2025 10:15 AM Alecia Fernandes MD [facility]

## 2025-04-08 NOTE — OP NOTE
[address] [address]                            OPERATIVE REPORT      PATIENT NAME: GISELLE TORRES                 : 1945  MED REC NO: [id number]                      ROOM: OR  ACCOUNT NO: [id number]                       ADMIT DATE: 2025  PROVIDER: Alecia Fernandes MD      DATE OF PROCEDURE:  2025    SURGEON:  Alecia Fernandes MD    PREOPERATIVE DIAGNOSIS:  End-stage glenoid osteoarthritis, left shoulder.    POSTOPERATIVE DIAGNOSIS:  End-stage glenoid osteoarthritis with biceps tendinopathy, left shoulder.    PROCEDURES:  Left shoulder open biceps tenodesis, anatomic total shoulder replacement using augmented full-wedged glenoid implant.  Modifier 22 applied because of severe glenoid retroversion over 30 degrees.  This complicated all aspects of procedure requiring additional time and effort to prep for an augmented glenoid.  This added roughly 40%, added work and effort compared to an otherwise uncomplicated primary anatomic total shoulder replacement.    ANESTHESIA:  General anesthesia, interscalene block.    IV FLUIDS:  1000 mL of crystalloid.    ESTIMATED BLOOD LOSS:  300 mL.    COMPLICATIONS:  None.    ASSISTANTS:  Sukhjinder Conroy DO, and Melchor Brower DO.  The availability of at least one of these skilled first assistant was critically important to help with humeral and glenoid exposure and layered closure including subscapularis repair.    IMPLANTS:  54 +7 full-wedge augmented implant.  A 54 x 20 neutral humeral head; neutral neck; and #3 CS edge.    FINDINGS:  Examination under anesthesia revealed global motion deficit, and arthrotomy revealed intact and healthy rotator cuff.  Bone quality was good on the humeral side.  Glenoid was sclerotic with severe glenoid retroversion.  The biceps was tendinopathic, amenable to tenotomy and then tenodesis.  There were large peripheral humeral osteophytes that had to be

## 2025-04-17 ENCOUNTER — OFFICE VISIT (OUTPATIENT)
Dept: ORTHOPEDIC SURGERY | Age: 80
End: 2025-04-17

## 2025-04-17 VITALS — HEIGHT: 73 IN | WEIGHT: 200 LBS | BODY MASS INDEX: 26.51 KG/M2

## 2025-04-17 DIAGNOSIS — M25.512 LEFT SHOULDER PAIN, UNSPECIFIED CHRONICITY: Primary | ICD-10-CM

## 2025-04-17 DIAGNOSIS — Z96.612 STATUS POST TOTAL REPLACEMENT OF LEFT SHOULDER: ICD-10-CM

## 2025-04-17 DIAGNOSIS — M19.012 OSTEOARTHRITIS OF LEFT GLENOHUMERAL JOINT: ICD-10-CM

## 2025-04-17 PROCEDURE — 99024 POSTOP FOLLOW-UP VISIT: CPT | Performed by: ORTHOPAEDIC SURGERY

## 2025-04-17 NOTE — PROGRESS NOTES
History of Present Illness:  Lane Borjas is a 80 y.o. male who presents for a post operative visit.  The patient underwent a left stem less anatomic total shoulder arthroplasty on 4/7/2025 by Dr. Alecia Fernandes.  Overall he does not report much pain and is off of his pain meds.  He has been compliant with wearing the UltraSling brace at all times.  He plans to go to  for PT. He would lie to work with [employer].  The patient denies any fevers, chills, numbness, tingling, and shortness of breath.    Medical History:  Patient's medications, allergies, past medical, surgical, social and family histories were reviewed and updated as appropriate.    No notes on file    Review of Systems  A 14 point review of systems was completed by the patient is available in the media section of the scanned medical record and was reviewed on 4/17/2025.      Vital Signs:  There were no vitals filed for this visit.    General/Appearance: Alert and oriented and in no apparent distress.    Skin:  There are no skin lesions, cellulitis, or extreme edema. The patient has warm and well-perfused Bilateral upper extremities with brisk capillary refill.      left Shoulder Exam:    Inspection: left shoulder incision that is clean, dry and intact and well approximated.  The Prineo dressing is still in place.  Mild ecchymosis and swelling are present as can be expected. There is no erythema, drainage or other signs of infection    Palpation:  No crepitus to gentle motion    Active Range of Motion: Deferred    Passive Range of Motion:  tolerates pendulum movement    Strength:  Deferred    Special Tests:  Deferred.    Neurovascular: Sensation to light touch is intact, no motor deficits, palpable radial pulses 2+    Radiology:     Plain radiographs of the left shoulder comprising 2 views: AP and axillary laateral were obtained and reviewed in the office: Shows postsurgical changes from the stem less total shoulder replacement.  All the components are in good

## 2025-04-21 ENCOUNTER — TELEPHONE (OUTPATIENT)
Dept: ORTHOPEDIC SURGERY | Age: 80
End: 2025-04-21

## 2025-05-01 ENCOUNTER — OFFICE VISIT (OUTPATIENT)
Dept: ORTHOPEDIC SURGERY | Age: 80
End: 2025-05-01

## 2025-05-01 VITALS — BODY MASS INDEX: 26.51 KG/M2 | WEIGHT: 200 LBS | HEIGHT: 73 IN

## 2025-05-01 DIAGNOSIS — Z96.612 STATUS POST TOTAL REPLACEMENT OF LEFT SHOULDER: Primary | ICD-10-CM

## 2025-05-01 DIAGNOSIS — M25.512 LEFT SHOULDER PAIN, UNSPECIFIED CHRONICITY: ICD-10-CM

## 2025-05-01 PROCEDURE — 99024 POSTOP FOLLOW-UP VISIT: CPT | Performed by: ORTHOPAEDIC SURGERY

## 2025-05-01 NOTE — PROGRESS NOTES
History of Present Illness:  Lane Borjas is a 80 y.o. male who presents for a post operative visit.  The patient underwent a left stem less anatomic total shoulder arthroplasty on 4/7/2025.  He is 3 weeks postop.  Overall patient reports he is doing quite well.  He has been compliant with wearing the UltraSling brace at all times.  He plans to go to  for PT. He is working with [employer] [employer].  The patient denies any fevers, chills, numbness, tingling, and shortness of breath.    Medical History:  Patient's medications, allergies, past medical, surgical, social and family histories were reviewed and updated as appropriate.    No notes on file    Review of Systems  A 14 point review of systems was completed by the patient is available in the media section of the scanned medical record and was reviewed on 5/1/2025.      Vital Signs:  There were no vitals filed for this visit.    General/Appearance: Alert and oriented and in no apparent distress.    Skin:  There are no skin lesions, cellulitis, or extreme edema. The patient has warm and well-perfused Bilateral upper extremities with brisk capillary refill.      left Shoulder Exam:    Inspection: left shoulder incision that is clean, dry and intact and well approximated. There is no erythema, drainage or other signs of infection    Palpation:  No crepitus to gentle motion    Active Range of Motion: Deferred    Passive Range of Motion:FE to 140 and ER of 20    Strength:  Deferred    Special Tests:  No Cain muscle deformity     Neurovascular: Sensation to light touch is intact, no motor deficits, palpable radial pulses 2+    Radiology:     Plain radiographs not obtained.       Assessment :  Mr. Lane Borjas is a 80 y.o. patient  underwent a left stem less anatomic total shoulder arthroplasty on 4/7/2025         Impression:  Encounter Diagnoses   Name Primary?    Left shoulder pain, unspecified chronicity     Status post total replacement of left shoulder Yes     Office

## 2025-05-29 ENCOUNTER — OFFICE VISIT (OUTPATIENT)
Dept: ORTHOPEDIC SURGERY | Age: 80
End: 2025-05-29

## 2025-05-29 DIAGNOSIS — Z96.612 STATUS POST TOTAL REPLACEMENT OF LEFT SHOULDER: ICD-10-CM

## 2025-05-29 DIAGNOSIS — M25.512 LEFT SHOULDER PAIN, UNSPECIFIED CHRONICITY: Primary | ICD-10-CM

## 2025-05-29 PROCEDURE — 99024 POSTOP FOLLOW-UP VISIT: CPT | Performed by: PHYSICIAN ASSISTANT

## 2025-05-29 NOTE — PROGRESS NOTES
History of Present Illness:  Lane Borjas is a 80 y.o. male who presents for a post operative visit.  The patient underwent a left stem less anatomic total shoulder arthroplasty on 4/7/2025.  He is 6 weeks postop.  Overall patient reports he is doing quite well.  He has been compliant with wearing the UltraSling brace at all times.  He plans to go to  for PT. He is working with [employer].  The patient denies any fevers, chills, numbness, tingling, and shortness of breath.    Medical History:  Patient's medications, allergies, past medical, surgical, social and family histories were reviewed and updated as appropriate.    No notes on file    Review of Systems  A 14 point review of systems was completed by the patient is available in the media section of the scanned medical record and was reviewed on 5/29/2025.      Vital Signs:  There were no vitals filed for this visit.    General/Appearance: Alert and oriented and in no apparent distress.    Skin:  There are no skin lesions, cellulitis, or extreme edema. The patient has warm and well-perfused Bilateral upper extremities with brisk capillary refill.      left Shoulder Exam:    Inspection: left shoulder incision has healed. There is no erythema, drainage or other signs of infection    Palpation:  No crepitus to gentle motion    Active Range of Motion: Forward elevation of 80 degrees, external rotation 20 and internal rotation of L5.  Passive Range of Motion:FE to 140 and ER of 20    Strength:  Deferred    Special Tests:  No Cain muscle deformity     Neurovascular: Sensation to light touch is intact, no motor deficits, palpable radial pulses 2+    Radiology:     Plain radiographs of the left shoulder including 2 views was obtained and reviewed.  It showed a left stemless anatomic shoulder replacement.  All the components are in good placement no signs of loosening.    Assessment :  Mr. Lane Borjas is a 80 y.o. patient  underwent a left stem less anatomic total

## 2025-06-26 ENCOUNTER — OFFICE VISIT (OUTPATIENT)
Dept: ORTHOPEDIC SURGERY | Age: 80
End: 2025-06-26

## 2025-06-26 VITALS — BODY MASS INDEX: 26.51 KG/M2 | HEIGHT: 73 IN | WEIGHT: 200 LBS

## 2025-06-26 DIAGNOSIS — Z96.612 STATUS POST TOTAL REPLACEMENT OF LEFT SHOULDER: Primary | ICD-10-CM

## 2025-06-26 PROCEDURE — 99024 POSTOP FOLLOW-UP VISIT: CPT | Performed by: ORTHOPAEDIC SURGERY

## 2025-06-26 RX ORDER — LISINOPRIL AND HYDROCHLOROTHIAZIDE 12.5; 2 MG/1; MG/1
1 TABLET ORAL 2 TIMES DAILY
COMMUNITY
Start: 2025-05-27

## 2025-06-26 RX ORDER — METOPROLOL SUCCINATE 50 MG/1
50 TABLET, EXTENDED RELEASE ORAL DAILY
COMMUNITY
Start: 2025-05-27

## 2025-06-26 NOTE — PROGRESS NOTES
Chief Complaint    Post-Op Check (LEFT SHOULDER)      History of Present Illness:  Lane Borjas is a pleasant, 80 y.o., male, here today for follow up of his left shoulder. The patient is now 11 weeks out following a left anatomic total shoulder arthroplasty. Surgery occurred on 4/7/25. He has continued in physical therapy at  PT. He reports that he feels that he is progressing well. He reports no new injuries or setbacks.     Pain Assessment  Location of Pain: Shoulder  Location Modifiers: Left  Relieving Factors: Rest  Work-Related Injury: No  Are there other pain locations you wish to document?: No      Medical History:  Patient's medications, allergies, past medical, surgical, social and family histories were reviewed and updated as appropriate.    No notes on file    Review of Systems  A 14 point review of systems was completed by the patient and is available in the media section of the scanned medical record and was reviewed on 6/26/2025.  The review is negative with the exception of those things mentioned in the HPI and Past Medical History    Vital Signs:  There were no vitals filed for this visit.    General/Appearance: Alert and oriented and in no apparent distress.    Skin:  There are no skin lesions, cellulitis, or extreme edema. The patient has warm and well-perfused Bilateral upper extremities with brisk capillary refill.      Left Shoulder Exam:  Inspection: No gross deformities, no signs of infection.    Palpation: Deferred    Active Range of Motion:  Forward Elevation 130, Internal Rotation L5    Passive Range of Motion: Forward Elevation 140    Strength:  Deferred    Special Tests:  - Belly press. No Cain muscle deformity.    Neurovascular: Sensation to light touch is intact, no motor deficits, palpable radial pulses 2+      Radiology:     No new XR obtained at this time.         Assessment :  Mr. Lane Borjas is a pleasant, 80 y.o. patient who is here today for follow up of his left shoulder.

## 2025-08-07 ENCOUNTER — OFFICE VISIT (OUTPATIENT)
Dept: ORTHOPEDIC SURGERY | Age: 80
End: 2025-08-07

## 2025-08-07 VITALS — WEIGHT: 200 LBS | BODY MASS INDEX: 26.51 KG/M2 | HEIGHT: 73 IN

## 2025-08-07 DIAGNOSIS — Z96.612 STATUS POST TOTAL REPLACEMENT OF LEFT SHOULDER: Primary | ICD-10-CM

## 2025-08-07 RX ORDER — LISINOPRIL 10 MG/1
10 TABLET ORAL DAILY
COMMUNITY

## (undated) DEVICE — SUTURE VICRYL SZ 2-0 L18IN ABSRB UD CT-1 L36MM 1/2 CIR J839D

## (undated) DEVICE — SOL IRR SOD CHL 0.9% TITAN XL CNTNR 3000ML

## (undated) DEVICE — SOLUTION IV 1000ML 0.9% SOD CHL

## (undated) DEVICE — GOWN,REINFRCE,POLY,ECLIPSE,SLV,3XLG: Brand: MEDLINE

## (undated) DEVICE — ALTIVATE REVERSE GLENOID NON-CANNULATED TAP, 6.5MM: Brand: DJO SURGICAL

## (undated) DEVICE — SUTURE MONOCRYL + SZ 4-0 L27IN ABSRB UD L19MM PS-2 3/8 CIR MCP426H

## (undated) DEVICE — 3M™ COBAN™ NL STERILE NON-LATEX SELF-ADHERENT WRAP, 2086S, 6 IN X 5 YD (15 CM X 4,5 M), 12 ROLLS/CASE: Brand: 3M™ COBAN™

## (undated) DEVICE — SUTURE VICRYL + SZ 0 L18IN ABSRB UD L36MM CT-1 1/2 CIR VCP840D

## (undated) DEVICE — ELECTRODE ELECSURG L 10.2 CM PTFE COAT MONOPOLAR BLADE OPN

## (undated) DEVICE — TOWEL,STOP FLAG GOLD N-W: Brand: MEDLINE

## (undated) DEVICE — SUTURE FIBERWIRE SZ 2 W/ TAPERED NEEDLE BLUE L38IN NONABSORB BLU L26.5MM 1/2 CIRCLE AR7200

## (undated) DEVICE — TOTAL SHOULDER: Brand: MEDLINE INDUSTRIES, INC.

## (undated) DEVICE — GUIDEWIRE ALTIVATE ANATOMIC AG 2.4 X 228MM STERILE
Type: IMPLANTABLE DEVICE | Site: SHOULDER | Status: NON-FUNCTIONAL
Removed: 2025-04-07

## (undated) DEVICE — GLOVE ORANGE PI 8   MSG9080

## (undated) DEVICE — MAT FLR W32XL58IN

## (undated) DEVICE — SOLUTION IRRIG 1000ML H2O PIC PLAS SHATTERPROOF CONTAINER

## (undated) DEVICE — GOWN,SIRUS,POLYRNF,BRTHSLV,XL,30/CS: Brand: MEDLINE

## (undated) DEVICE — ADHESIVE SKIN CLOSURE WND 8.661X1.5 IN 22 CM LIQUIBAND SECUR

## (undated) DEVICE — GOWN,SIRUS,POLYRNF,BRTHSLV,XLN/XXL,18/CS: Brand: MEDLINE

## (undated) DEVICE — SUTURE ETHIBOND EXCEL SZ 2 L30IN NONABSORBABLE GRN L40MM V-37 MX69G

## (undated) DEVICE — UNDERGLOVE SURG SZ 8 BLU LTX FREE SYN POLYISOPRENE POLYMER